# Patient Record
Sex: FEMALE | Race: WHITE | NOT HISPANIC OR LATINO | Employment: OTHER | URBAN - METROPOLITAN AREA
[De-identification: names, ages, dates, MRNs, and addresses within clinical notes are randomized per-mention and may not be internally consistent; named-entity substitution may affect disease eponyms.]

---

## 2017-03-07 ENCOUNTER — HOSPITAL ENCOUNTER (OUTPATIENT)
Dept: LAB | Facility: MEDICAL CENTER | Age: 37
End: 2017-03-07
Attending: FAMILY MEDICINE
Payer: COMMERCIAL

## 2017-03-07 LAB
ALBUMIN SERPL BCP-MCNC: 4.6 G/DL (ref 3.2–4.9)
ALBUMIN/GLOB SERPL: 1.6 G/DL
ALP SERPL-CCNC: 67 U/L (ref 30–99)
ALT SERPL-CCNC: 45 U/L (ref 2–50)
ANION GAP SERPL CALC-SCNC: 13 MMOL/L (ref 0–11.9)
AST SERPL-CCNC: 31 U/L (ref 12–45)
BASOPHILS # BLD AUTO: 0.04 K/UL (ref 0–0.12)
BASOPHILS NFR BLD AUTO: 0.7 % (ref 0–1.8)
BILIRUB SERPL-MCNC: 0.6 MG/DL (ref 0.1–1.5)
BUN SERPL-MCNC: 10 MG/DL (ref 8–22)
CALCIUM SERPL-MCNC: 9.7 MG/DL (ref 8.5–10.5)
CHLORIDE SERPL-SCNC: 105 MMOL/L (ref 96–112)
CHOLEST SERPL-MCNC: 135 MG/DL (ref 100–199)
CO2 SERPL-SCNC: 24 MMOL/L (ref 20–33)
CREAT SERPL-MCNC: 0.7 MG/DL (ref 0.5–1.4)
EOSINOPHIL # BLD: 0.03 K/UL (ref 0–0.51)
EOSINOPHIL NFR BLD AUTO: 0.5 % (ref 0–6.9)
ERYTHROCYTE [DISTWIDTH] IN BLOOD BY AUTOMATED COUNT: 45.6 FL (ref 35.9–50)
GLOBULIN SER CALC-MCNC: 2.9 G/DL (ref 1.9–3.5)
GLUCOSE SERPL-MCNC: 93 MG/DL (ref 65–99)
HCT VFR BLD AUTO: 47.3 % (ref 37–47)
HDLC SERPL-MCNC: 73 MG/DL
HGB BLD-MCNC: 15.3 G/DL (ref 12–16)
IMM GRANULOCYTES # BLD AUTO: 0.01 K/UL (ref 0–0.11)
IMM GRANULOCYTES NFR BLD AUTO: 0.2 % (ref 0–0.9)
LDLC SERPL CALC-MCNC: 48 MG/DL
LYMPHOCYTES # BLD: 1.39 K/UL (ref 1–4.8)
LYMPHOCYTES NFR BLD AUTO: 23.8 % (ref 22–41)
MCH RBC QN AUTO: 31.2 PG (ref 27–33)
MCHC RBC AUTO-ENTMCNC: 32.3 G/DL (ref 33.6–35)
MCV RBC AUTO: 96.5 FL (ref 81.4–97.8)
MONOCYTES # BLD: 0.3 K/UL (ref 0–0.85)
MONOCYTES NFR BLD AUTO: 5.1 % (ref 0–13.4)
NEUTROPHILS # BLD: 4.06 K/UL (ref 2–7.15)
NEUTROPHILS NFR BLD AUTO: 69.7 % (ref 44–72)
NRBC # BLD AUTO: 0.02 K/UL
NRBC BLD-RTO: 0.3 /100 WBC
PLATELET # BLD AUTO: 279 K/UL (ref 164–446)
PMV BLD AUTO: 10.1 FL (ref 9–12.9)
POTASSIUM SERPL-SCNC: 3.5 MMOL/L (ref 3.6–5.5)
PROT SERPL-MCNC: 7.5 G/DL (ref 6–8.2)
RBC # BLD AUTO: 4.9 M/UL (ref 4.2–5.4)
SODIUM SERPL-SCNC: 142 MMOL/L (ref 135–145)
TRIGL SERPL-MCNC: 68 MG/DL (ref 0–149)
TSH SERPL DL<=0.005 MIU/L-ACNC: 3.3 UIU/ML (ref 0.3–3.7)
WBC # BLD AUTO: 5.8 K/UL (ref 4.8–10.8)

## 2017-03-07 PROCEDURE — 85025 COMPLETE CBC W/AUTO DIFF WBC: CPT

## 2017-03-07 PROCEDURE — 84443 ASSAY THYROID STIM HORMONE: CPT

## 2017-03-07 PROCEDURE — 80061 LIPID PANEL: CPT

## 2017-03-07 PROCEDURE — 36415 COLL VENOUS BLD VENIPUNCTURE: CPT

## 2017-03-07 PROCEDURE — 80053 COMPREHEN METABOLIC PANEL: CPT

## 2017-04-17 DIAGNOSIS — Z79.899 HIGH RISK MEDICATION USE: ICD-10-CM

## 2017-04-17 DIAGNOSIS — Z01.818 PRE-OPERATIVE CLEARANCE: ICD-10-CM

## 2017-05-04 ENCOUNTER — HOSPITAL ENCOUNTER (OUTPATIENT)
Dept: RADIOLOGY | Facility: MEDICAL CENTER | Age: 37
End: 2017-05-04
Attending: FAMILY MEDICINE
Payer: COMMERCIAL

## 2017-05-04 VITALS
BODY MASS INDEX: 25.58 KG/M2 | OXYGEN SATURATION: 96 % | RESPIRATION RATE: 16 BRPM | HEART RATE: 65 BPM | DIASTOLIC BLOOD PRESSURE: 74 MMHG | SYSTOLIC BLOOD PRESSURE: 112 MMHG | HEIGHT: 67 IN | WEIGHT: 163 LBS

## 2017-05-04 DIAGNOSIS — R07.9 CHEST PAIN, UNSPECIFIED TYPE: ICD-10-CM

## 2017-05-04 PROCEDURE — 75574 CT ANGIO HRT W/3D IMAGE: CPT

## 2017-05-04 PROCEDURE — 700117 HCHG RX CONTRAST REV CODE 255: Performed by: FAMILY MEDICINE

## 2017-05-04 RX ADMIN — IOHEXOL 100 ML: 350 INJECTION, SOLUTION INTRAVENOUS at 16:22

## 2017-05-04 NOTE — FLOWSHEET NOTE
Patient tolerated CTA without incident. No meds given. Monitored with stable VS. Encouraged to drink fluids over next 24 hours. D/C to  in stable condition.

## 2017-07-07 ENCOUNTER — HOSPITAL ENCOUNTER (OUTPATIENT)
Dept: LAB | Facility: MEDICAL CENTER | Age: 37
End: 2017-07-07
Attending: OBSTETRICS & GYNECOLOGY
Payer: COMMERCIAL

## 2017-07-07 LAB
25(OH)D3 SERPL-MCNC: 30 NG/ML (ref 30–100)
ALBUMIN SERPL BCP-MCNC: 4 G/DL (ref 3.2–4.9)
ALBUMIN/GLOB SERPL: 1.4 G/DL
ALP SERPL-CCNC: 54 U/L (ref 30–99)
ALT SERPL-CCNC: 33 U/L (ref 2–50)
ANION GAP SERPL CALC-SCNC: 6 MMOL/L (ref 0–11.9)
AST SERPL-CCNC: 25 U/L (ref 12–45)
BILIRUB SERPL-MCNC: 0.5 MG/DL (ref 0.1–1.5)
BUN SERPL-MCNC: 14 MG/DL (ref 8–22)
CALCIUM SERPL-MCNC: 9.5 MG/DL (ref 8.5–10.5)
CHLORIDE SERPL-SCNC: 108 MMOL/L (ref 96–112)
CHOLEST SERPL-MCNC: 265 MG/DL (ref 100–199)
CO2 SERPL-SCNC: 26 MMOL/L (ref 20–33)
CREAT SERPL-MCNC: 0.75 MG/DL (ref 0.5–1.4)
CRP SERPL HS-MCNC: 1.3 MG/L (ref 0–7.5)
GFR SERPL CREATININE-BSD FRML MDRD: >60 ML/MIN/1.73 M 2
GLOBULIN SER CALC-MCNC: 2.9 G/DL (ref 1.9–3.5)
GLUCOSE SERPL-MCNC: 86 MG/DL (ref 65–99)
HDLC SERPL-MCNC: 57 MG/DL
LDLC SERPL CALC-MCNC: 187 MG/DL
POTASSIUM SERPL-SCNC: 4.5 MMOL/L (ref 3.6–5.5)
PROT SERPL-MCNC: 6.9 G/DL (ref 6–8.2)
SODIUM SERPL-SCNC: 140 MMOL/L (ref 135–145)
T3 SERPL-MCNC: 102.6 NG/DL (ref 60–181)
T3FREE SERPL-MCNC: 3.21 PG/ML (ref 2.4–4.2)
T4 FREE SERPL-MCNC: 0.79 NG/DL (ref 0.53–1.43)
TRIGL SERPL-MCNC: 104 MG/DL (ref 0–149)
TSH SERPL DL<=0.005 MIU/L-ACNC: 4.81 UIU/ML (ref 0.3–3.7)

## 2017-07-07 PROCEDURE — 36415 COLL VENOUS BLD VENIPUNCTURE: CPT

## 2017-07-07 PROCEDURE — 84443 ASSAY THYROID STIM HORMONE: CPT

## 2017-07-07 PROCEDURE — 80053 COMPREHEN METABOLIC PANEL: CPT

## 2017-07-07 PROCEDURE — 84481 FREE ASSAY (FT-3): CPT

## 2017-07-07 PROCEDURE — 84439 ASSAY OF FREE THYROXINE: CPT

## 2017-07-07 PROCEDURE — 86141 C-REACTIVE PROTEIN HS: CPT

## 2017-07-07 PROCEDURE — 80061 LIPID PANEL: CPT

## 2017-07-07 PROCEDURE — 82306 VITAMIN D 25 HYDROXY: CPT

## 2017-07-07 PROCEDURE — 84480 ASSAY TRIIODOTHYRONINE (T3): CPT

## 2017-07-13 ENCOUNTER — HOSPITAL ENCOUNTER (OUTPATIENT)
Dept: LAB | Facility: MEDICAL CENTER | Age: 37
End: 2017-07-13
Attending: OBSTETRICS & GYNECOLOGY
Payer: COMMERCIAL

## 2017-07-13 LAB — THYROPEROXIDASE AB SERPL-ACNC: <0.2 IU/ML (ref 0–9)

## 2017-07-13 PROCEDURE — 36415 COLL VENOUS BLD VENIPUNCTURE: CPT

## 2017-07-13 PROCEDURE — 86800 THYROGLOBULIN ANTIBODY: CPT

## 2017-07-13 PROCEDURE — 86376 MICROSOMAL ANTIBODY EACH: CPT

## 2017-07-15 LAB — THYROGLOB AB SERPL-ACNC: <0.9 IU/ML (ref 0–4)

## 2017-08-28 ENCOUNTER — HOSPITAL ENCOUNTER (OUTPATIENT)
Dept: LAB | Facility: MEDICAL CENTER | Age: 37
End: 2017-08-28
Attending: OBSTETRICS & GYNECOLOGY
Payer: COMMERCIAL

## 2017-08-28 LAB
T3FREE SERPL-MCNC: 3.22 PG/ML (ref 2.4–4.2)
T4 FREE SERPL-MCNC: 0.74 NG/DL (ref 0.53–1.43)
TSH SERPL DL<=0.005 MIU/L-ACNC: 1.32 UIU/ML (ref 0.3–3.7)

## 2017-08-28 PROCEDURE — 36415 COLL VENOUS BLD VENIPUNCTURE: CPT

## 2017-08-28 PROCEDURE — 84443 ASSAY THYROID STIM HORMONE: CPT

## 2017-08-28 PROCEDURE — 84481 FREE ASSAY (FT-3): CPT

## 2017-08-28 PROCEDURE — 84439 ASSAY OF FREE THYROXINE: CPT

## 2017-09-12 ENCOUNTER — OFFICE VISIT (OUTPATIENT)
Dept: URGENT CARE | Facility: PHYSICIAN GROUP | Age: 37
End: 2017-09-12
Payer: COMMERCIAL

## 2017-09-12 VITALS
BODY MASS INDEX: 24.8 KG/M2 | SYSTOLIC BLOOD PRESSURE: 110 MMHG | DIASTOLIC BLOOD PRESSURE: 78 MMHG | TEMPERATURE: 98.2 F | RESPIRATION RATE: 12 BRPM | WEIGHT: 158 LBS | OXYGEN SATURATION: 97 % | HEIGHT: 67 IN | HEART RATE: 76 BPM

## 2017-09-12 DIAGNOSIS — T21.23XA: ICD-10-CM

## 2017-09-12 PROCEDURE — 99203 OFFICE O/P NEW LOW 30 MIN: CPT | Performed by: NURSE PRACTITIONER

## 2017-09-12 ASSESSMENT — ENCOUNTER SYMPTOMS
MYALGIAS: 0
CHILLS: 0
BURN: 1
VOMITING: 0
NAUSEA: 0
FEVER: 0

## 2017-09-12 NOTE — PROGRESS NOTES
"Subjective:      Candy Dey is a 37 y.o. female who presents with Burn (Burn on left upper back from cupping procedure x 24hrs)            Burn   This is a new problem. The current episode started yesterday. The problem occurs constantly. The problem has been unchanged. Pertinent negatives include no chills, fever, myalgias, nausea or vomiting. Nothing aggravates the symptoms. She has tried nothing for the symptoms.   burn to posterior left shoulder/upper back since yesterday. Burned during \"cupping\" session.     Review of Systems   Constitutional: Negative for chills and fever.   Gastrointestinal: Negative for nausea and vomiting.   Musculoskeletal: Negative for myalgias.          Objective:     /78   Pulse 76   Temp 36.8 °C (98.2 °F)   Resp 12   Ht 1.702 m (5' 7\")   Wt 71.7 kg (158 lb)   SpO2 97%   Breastfeeding? No   BMI 24.75 kg/m²      Physical Exam   Constitutional: She is oriented to person, place, and time. She appears well-developed and well-nourished. No distress.   Cardiovascular: Normal rate, regular rhythm and normal heart sounds.    No murmur heard.  Pulmonary/Chest: Effort normal and breath sounds normal. No respiratory distress.   Musculoskeletal: Normal range of motion.   Moves all 4 extremities normally   Neurological: She is alert and oriented to person, place, and time.   Skin: Skin is warm and dry. Burn noted.        Second degree burn with broken blister noted. Area dressed with silvadene and non adherent dressing.   Psychiatric: She has a normal mood and affect. Her behavior is normal. Thought content normal.   Nursing note and vitals reviewed.              Assessment/Plan:     1. Second degree burn of upper back, initial encounter  silver sulfADIAZINE (SILVADENE) 1 % Cream     Burn care reviewed with patient.   Differential diagnosis, natural history, supportive care, and indications for immediate follow-up discussed at length.     "

## 2017-10-11 ENCOUNTER — HOSPITAL ENCOUNTER (OUTPATIENT)
Dept: LAB | Facility: MEDICAL CENTER | Age: 37
End: 2017-10-11
Attending: FAMILY MEDICINE
Payer: COMMERCIAL

## 2017-10-11 LAB
ALBUMIN SERPL BCP-MCNC: 4.4 G/DL (ref 3.2–4.9)
ALBUMIN/GLOB SERPL: 1.7 G/DL
ALP SERPL-CCNC: 66 U/L (ref 30–99)
ALT SERPL-CCNC: 29 U/L (ref 2–50)
ANION GAP SERPL CALC-SCNC: 9 MMOL/L (ref 0–11.9)
AST SERPL-CCNC: 21 U/L (ref 12–45)
BILIRUB SERPL-MCNC: 0.5 MG/DL (ref 0.1–1.5)
BUN SERPL-MCNC: 14 MG/DL (ref 8–22)
CALCIUM SERPL-MCNC: 9.7 MG/DL (ref 8.5–10.5)
CHLORIDE SERPL-SCNC: 106 MMOL/L (ref 96–112)
CHOLEST SERPL-MCNC: 158 MG/DL (ref 100–199)
CO2 SERPL-SCNC: 26 MMOL/L (ref 20–33)
CREAT SERPL-MCNC: 0.72 MG/DL (ref 0.5–1.4)
GFR SERPL CREATININE-BSD FRML MDRD: >60 ML/MIN/1.73 M 2
GLOBULIN SER CALC-MCNC: 2.6 G/DL (ref 1.9–3.5)
GLUCOSE SERPL-MCNC: 100 MG/DL (ref 65–99)
HDLC SERPL-MCNC: 61 MG/DL
LDLC SERPL CALC-MCNC: 74 MG/DL
POTASSIUM SERPL-SCNC: 4.5 MMOL/L (ref 3.6–5.5)
PROT SERPL-MCNC: 7 G/DL (ref 6–8.2)
SODIUM SERPL-SCNC: 141 MMOL/L (ref 135–145)
TRIGL SERPL-MCNC: 113 MG/DL (ref 0–149)

## 2017-10-11 PROCEDURE — 80053 COMPREHEN METABOLIC PANEL: CPT

## 2017-10-11 PROCEDURE — 80061 LIPID PANEL: CPT

## 2017-10-11 PROCEDURE — 36415 COLL VENOUS BLD VENIPUNCTURE: CPT

## 2018-07-05 ENCOUNTER — APPOINTMENT (OUTPATIENT)
Dept: RADIOLOGY | Facility: MEDICAL CENTER | Age: 38
End: 2018-07-05
Attending: ORTHOPAEDIC SURGERY
Payer: COMMERCIAL

## 2018-07-05 DIAGNOSIS — M54.12 BRACHIAL NEURITIS: ICD-10-CM

## 2018-07-05 PROCEDURE — 72141 MRI NECK SPINE W/O DYE: CPT

## 2018-09-20 ENCOUNTER — HOSPITAL ENCOUNTER (OUTPATIENT)
Dept: LAB | Facility: MEDICAL CENTER | Age: 38
End: 2018-09-20
Attending: FAMILY MEDICINE
Payer: COMMERCIAL

## 2018-09-20 LAB
ALBUMIN SERPL BCP-MCNC: 4.7 G/DL (ref 3.2–4.9)
ALBUMIN/GLOB SERPL: 1.8 G/DL
ALP SERPL-CCNC: 67 U/L (ref 30–99)
ALT SERPL-CCNC: 44 U/L (ref 2–50)
ANION GAP SERPL CALC-SCNC: 11 MMOL/L (ref 0–11.9)
APPEARANCE UR: CLEAR
AST SERPL-CCNC: 28 U/L (ref 12–45)
BASOPHILS # BLD AUTO: 0.7 % (ref 0–1.8)
BASOPHILS # BLD: 0.04 K/UL (ref 0–0.12)
BILIRUB SERPL-MCNC: 0.7 MG/DL (ref 0.1–1.5)
BILIRUB UR QL STRIP.AUTO: NEGATIVE
BUN SERPL-MCNC: 14 MG/DL (ref 8–22)
CALCIUM SERPL-MCNC: 9.9 MG/DL (ref 8.5–10.5)
CHLORIDE SERPL-SCNC: 101 MMOL/L (ref 96–112)
CHOLEST SERPL-MCNC: 151 MG/DL (ref 100–199)
CO2 SERPL-SCNC: 27 MMOL/L (ref 20–33)
COLOR UR: YELLOW
CREAT SERPL-MCNC: 0.79 MG/DL (ref 0.5–1.4)
EOSINOPHIL # BLD AUTO: 0.16 K/UL (ref 0–0.51)
EOSINOPHIL NFR BLD: 3 % (ref 0–6.9)
ERYTHROCYTE [DISTWIDTH] IN BLOOD BY AUTOMATED COUNT: 44.7 FL (ref 35.9–50)
ESTRADIOL SERPL-MCNC: <20 PG/ML
FASTING STATUS PATIENT QL REPORTED: NORMAL
FSH SERPL-ACNC: 146.8 MIU/ML
GLOBULIN SER CALC-MCNC: 2.6 G/DL (ref 1.9–3.5)
GLUCOSE SERPL-MCNC: 91 MG/DL (ref 65–99)
GLUCOSE UR STRIP.AUTO-MCNC: NEGATIVE MG/DL
HCT VFR BLD AUTO: 47.9 % (ref 37–47)
HDLC SERPL-MCNC: 62 MG/DL
HGB BLD-MCNC: 16 G/DL (ref 12–16)
IMM GRANULOCYTES # BLD AUTO: 0.01 K/UL (ref 0–0.11)
IMM GRANULOCYTES NFR BLD AUTO: 0.2 % (ref 0–0.9)
KETONES UR STRIP.AUTO-MCNC: NEGATIVE MG/DL
LDLC SERPL CALC-MCNC: 67 MG/DL
LEUKOCYTE ESTERASE UR QL STRIP.AUTO: NEGATIVE
LH SERPL-ACNC: 58 IU/L
LYMPHOCYTES # BLD AUTO: 2.2 K/UL (ref 1–4.8)
LYMPHOCYTES NFR BLD: 40.7 % (ref 22–41)
MCH RBC QN AUTO: 32.5 PG (ref 27–33)
MCHC RBC AUTO-ENTMCNC: 33.4 G/DL (ref 33.6–35)
MCV RBC AUTO: 97.2 FL (ref 81.4–97.8)
MICRO URNS: NORMAL
MONOCYTES # BLD AUTO: 0.37 K/UL (ref 0–0.85)
MONOCYTES NFR BLD AUTO: 6.9 % (ref 0–13.4)
NEUTROPHILS # BLD AUTO: 2.62 K/UL (ref 2–7.15)
NEUTROPHILS NFR BLD: 48.5 % (ref 44–72)
NITRITE UR QL STRIP.AUTO: NEGATIVE
NRBC # BLD AUTO: 0 K/UL
NRBC BLD-RTO: 0 /100 WBC
PH UR STRIP.AUTO: 6 [PH]
PLATELET # BLD AUTO: 289 K/UL (ref 164–446)
PMV BLD AUTO: 10.4 FL (ref 9–12.9)
POTASSIUM SERPL-SCNC: 4.6 MMOL/L (ref 3.6–5.5)
PROT SERPL-MCNC: 7.3 G/DL (ref 6–8.2)
PROT UR QL STRIP: NEGATIVE MG/DL
RBC # BLD AUTO: 4.93 M/UL (ref 4.2–5.4)
RBC UR QL AUTO: NEGATIVE
SODIUM SERPL-SCNC: 139 MMOL/L (ref 135–145)
SP GR UR STRIP.AUTO: 1.02
T4 SERPL-MCNC: 8 UG/DL (ref 4–12)
TRIGL SERPL-MCNC: 108 MG/DL (ref 0–149)
TSH SERPL DL<=0.005 MIU/L-ACNC: 3.49 UIU/ML (ref 0.38–5.33)
UROBILINOGEN UR STRIP.AUTO-MCNC: 0.2 MG/DL
WBC # BLD AUTO: 5.4 K/UL (ref 4.8–10.8)

## 2018-09-20 PROCEDURE — 36415 COLL VENOUS BLD VENIPUNCTURE: CPT

## 2018-09-20 PROCEDURE — 84443 ASSAY THYROID STIM HORMONE: CPT

## 2018-09-20 PROCEDURE — 83002 ASSAY OF GONADOTROPIN (LH): CPT

## 2018-09-20 PROCEDURE — 83001 ASSAY OF GONADOTROPIN (FSH): CPT

## 2018-09-20 PROCEDURE — 84436 ASSAY OF TOTAL THYROXINE: CPT

## 2018-09-20 PROCEDURE — 85652 RBC SED RATE AUTOMATED: CPT

## 2018-09-20 PROCEDURE — 80053 COMPREHEN METABOLIC PANEL: CPT

## 2018-09-20 PROCEDURE — 85025 COMPLETE CBC W/AUTO DIFF WBC: CPT

## 2018-09-20 PROCEDURE — 81003 URINALYSIS AUTO W/O SCOPE: CPT

## 2018-09-20 PROCEDURE — 80061 LIPID PANEL: CPT

## 2018-09-20 PROCEDURE — 82670 ASSAY OF TOTAL ESTRADIOL: CPT

## 2018-09-21 LAB — ERYTHROCYTE [SEDIMENTATION RATE] IN BLOOD BY WESTERGREN METHOD: 5 MM/HOUR (ref 0–20)

## 2019-03-21 ENCOUNTER — HOSPITAL ENCOUNTER (OUTPATIENT)
Dept: RADIOLOGY | Facility: MEDICAL CENTER | Age: 39
End: 2019-03-21
Attending: PHYSICIAN ASSISTANT
Payer: COMMERCIAL

## 2019-03-21 ENCOUNTER — OFFICE VISIT (OUTPATIENT)
Dept: URGENT CARE | Facility: PHYSICIAN GROUP | Age: 39
End: 2019-03-21
Payer: COMMERCIAL

## 2019-03-21 VITALS
SYSTOLIC BLOOD PRESSURE: 110 MMHG | OXYGEN SATURATION: 95 % | TEMPERATURE: 98.5 F | RESPIRATION RATE: 16 BRPM | BODY MASS INDEX: 24.64 KG/M2 | WEIGHT: 157 LBS | HEART RATE: 84 BPM | DIASTOLIC BLOOD PRESSURE: 66 MMHG | HEIGHT: 67 IN

## 2019-03-21 DIAGNOSIS — H66.001 ACUTE SUPPURATIVE OTITIS MEDIA OF RIGHT EAR WITHOUT SPONTANEOUS RUPTURE OF TYMPANIC MEMBRANE, RECURRENCE NOT SPECIFIED: Primary | ICD-10-CM

## 2019-03-21 DIAGNOSIS — R05.9 COUGH: ICD-10-CM

## 2019-03-21 DIAGNOSIS — R09.82 POST-NASAL DRIP: ICD-10-CM

## 2019-03-21 PROCEDURE — 71046 X-RAY EXAM CHEST 2 VIEWS: CPT

## 2019-03-21 PROCEDURE — 99214 OFFICE O/P EST MOD 30 MIN: CPT | Performed by: PHYSICIAN ASSISTANT

## 2019-03-21 RX ORDER — BENZONATATE 200 MG/1
200 CAPSULE ORAL 3 TIMES DAILY PRN
Qty: 60 CAP | Refills: 0 | Status: SHIPPED | OUTPATIENT
Start: 2019-03-21 | End: 2020-05-29

## 2019-03-21 RX ORDER — CODEINE PHOSPHATE/GUAIFENESIN 10-100MG/5
10 LIQUID (ML) ORAL 4 TIMES DAILY PRN
Qty: 200 ML | Refills: 0 | Status: SHIPPED | OUTPATIENT
Start: 2019-03-21 | End: 2019-03-26

## 2019-03-21 RX ORDER — AMOXICILLIN AND CLAVULANATE POTASSIUM 875; 125 MG/1; MG/1
1 TABLET, FILM COATED ORAL 2 TIMES DAILY
Qty: 20 TAB | Refills: 0 | Status: SHIPPED | OUTPATIENT
Start: 2019-03-21 | End: 2019-03-25

## 2019-03-21 RX ORDER — FLUCONAZOLE 150 MG/1
150 TABLET ORAL DAILY
Qty: 1 TAB | Refills: 0 | Status: SHIPPED | OUTPATIENT
Start: 2019-03-21 | End: 2020-05-29

## 2019-03-21 ASSESSMENT — ENCOUNTER SYMPTOMS: COUGH: 1

## 2019-03-21 NOTE — PROGRESS NOTES
"Subjective:      Candy Dey is a 39 y.o. female who presents with Cough (cough, congestion, fatigue, x 2.5 weeks is on z-pack x 3 days)    PMH: Reviewed with patient/family member/EPIC.    MEDS:   Current Outpatient Prescriptions:   •  Evolocumab (REPATHA SC), Inject  as instructed., Disp: , Rfl:   •  alprazolam (XANAX) 0.5 MG Tab, Take 0.5-1 mg by mouth every evening., Disp: , Rfl:   •  amphetamine-dextroamphetamine (ADDERALL) 20 MG Tab, Take 20 mg by mouth 2 times a day. Indications: takes as needed (normally takes one \"regularly\"), Disp: , Rfl:   •  atorvastatin (LIPITOR) 20 MG Tab, Take 80 mg by mouth every evening., Disp: , Rfl:   •  estradiol (ESTRACE) 1 MG Tab, Take 1 mg by mouth 2 Times a Day. Indications: 1/2 in am and full tablet at night., Disp: , Rfl:   •  silver sulfADIAZINE (SILVADENE) 1 % Cream, Apply 1 g to affected area(s) every day. (Patient not taking: Reported on 3/21/2019), Disp: 60 g, Rfl: 0  •  aspirin (ASA) 325 MG Tab, Take 1 Tab by mouth every day., Disp: 100 Tab, Rfl: 0  ALLERGIES: No Known Allergies  SURGHX: No past surgical history on file.  SOCHX:  reports that she has been smoking Cigarettes.  She has never used smokeless tobacco. She reports that she drinks alcohol. She reports that she uses drugs, including Oral.  FH: Reviewed with patient, not pertinent to this visit.           Patient presents with:  Cough: cough, congestion, fatigue, x 2.5 weeks is on z-pack x 3 days          Cough   This is a new problem. The current episode started 1 to 4 weeks ago. The problem has been gradually worsening. The problem occurs every few minutes. The cough is productive of sputum. Associated symptoms include ear pain, headaches, nasal congestion, postnasal drip and rhinorrhea. Pertinent negatives include no fever, shortness of breath or wheezing. The symptoms are aggravated by lying down and cold air. She has tried body position changes and OTC cough suppressant (zpack) for the symptoms. " "The treatment provided no relief. Her past medical history is significant for bronchitis.       Review of Systems   Constitutional: Negative for fever.   HENT: Positive for congestion, ear pain, postnasal drip, rhinorrhea and sinus pain.    Respiratory: Positive for cough and sputum production. Negative for shortness of breath and wheezing.    Neurological: Positive for headaches.   All other systems reviewed and are negative.         Objective:     /66 (BP Location: Left arm, Patient Position: Sitting, BP Cuff Size: Adult)   Pulse 84   Temp 36.9 °C (98.5 °F) (Temporal)   Resp 16   Ht 1.702 m (5' 7\")   Wt 71.2 kg (157 lb)   SpO2 95%   BMI 24.59 kg/m²      Physical Exam   Constitutional: She is oriented to person, place, and time. She appears well-developed and well-nourished. No distress.   HENT:   Head: Normocephalic and atraumatic.   Right Ear: Tympanic membrane is injected, erythematous and bulging. A middle ear effusion is present.   Left Ear: Tympanic membrane normal.   Nose: Mucosal edema and rhinorrhea present.   Mouth/Throat: Uvula is midline, oropharynx is clear and moist and mucous membranes are normal.   Eyes: Pupils are equal, round, and reactive to light. Conjunctivae and EOM are normal.   Neck: Normal range of motion. Neck supple.   Cardiovascular: Normal rate, regular rhythm and normal heart sounds.    Pulmonary/Chest: Effort normal. No respiratory distress.   Abdominal: Soft.   Musculoskeletal: Normal range of motion.   Neurological: She is alert and oriented to person, place, and time. Gait normal.   Skin: Skin is warm and dry. Capillary refill takes less than 2 seconds.   Psychiatric: She has a normal mood and affect.   Nursing note and vitals reviewed.            Xray images viewed and interpreted by me, confirmed by radiology:      CHEST   FINDINGS:  Cardiomediastinal contour is within normal limits.  No focal pulmonary consolidation.  No pleural fluid collection or " pneumothorax.  Mild degenerative change of thoracic spine.   Impression       No acute cardiopulmonary disease.   Reading Provider Reading Date   Steven Stoddard M.D. Mar 21, 2019   Signing Provider Signing Date Signing Time   Steven Stoddard M.D. Mar 21, 2019  1:45 PM          Assessment/Plan:      1. Acute suppurative otitis media of right ear without spontaneous rupture of tympanic membrane, recurrence not specified  fluconazole (DIFLUCAN) 150 MG tablet    guaifenesin-codeine (TUSSI-ORGANIDIN NR) 100-10 MG/5ML syrup    benzonatate (TESSALON) 200 MG capsule        2. Cough  DX-CHEST-2 VIEWS    fluconazole (DIFLUCAN) 150 MG tablet    guaifenesin-codeine (TUSSI-ORGANIDIN NR) 100-10 MG/5ML syrup    benzonatate (TESSALON) 200 MG capsule        3. Post-nasal drip  fluconazole (DIFLUCAN) 150 MG tablet    guaifenesin-codeine (TUSSI-ORGANIDIN NR) 100-10 MG/5ML syrup    benzonatate (TESSALON) 200 MG capsule          Pt to begin augmentin today.     PT instructed not to drive or operate heavy machinery or drink alcohol while taking this medication because it contains either a narcotic or benzodiazepines which causes drowsiness. PT verbalized understanding of these instructions.     [facility] Aware web site evaluation: I have obtained and reviewed patient utilization report from [facility] pharmacy database prior to writing prescription for controlled substance.  No history of abuse.

## 2019-03-25 ENCOUNTER — OFFICE VISIT (OUTPATIENT)
Dept: URGENT CARE | Facility: PHYSICIAN GROUP | Age: 39
End: 2019-03-25
Payer: COMMERCIAL

## 2019-03-25 VITALS
TEMPERATURE: 96.7 F | SYSTOLIC BLOOD PRESSURE: 118 MMHG | DIASTOLIC BLOOD PRESSURE: 80 MMHG | RESPIRATION RATE: 18 BRPM | OXYGEN SATURATION: 94 % | HEIGHT: 67 IN | BODY MASS INDEX: 24.48 KG/M2 | HEART RATE: 91 BPM | WEIGHT: 156 LBS

## 2019-03-25 DIAGNOSIS — H66.001 ACUTE SUPPURATIVE OTITIS MEDIA OF RIGHT EAR WITHOUT SPONTANEOUS RUPTURE OF TYMPANIC MEMBRANE, RECURRENCE NOT SPECIFIED: ICD-10-CM

## 2019-03-25 PROCEDURE — 99213 OFFICE O/P EST LOW 20 MIN: CPT | Performed by: FAMILY MEDICINE

## 2019-03-25 RX ORDER — CEFDINIR 300 MG/1
300 CAPSULE ORAL 2 TIMES DAILY
Qty: 20 CAP | Refills: 0 | Status: SHIPPED | OUTPATIENT
Start: 2019-03-25 | End: 2020-05-29

## 2019-03-25 ASSESSMENT — ENCOUNTER SYMPTOMS
DIARRHEA: 0
WHEEZING: 0
RHINORRHEA: 1
SORE THROAT: 0
NAUSEA: 0
COUGH: 1
SHORTNESS OF BREATH: 0
SHORTNESS OF BREATH: 0
SPUTUM PRODUCTION: 1
FEVER: 0
ABDOMINAL PAIN: 0
HEADACHES: 0
HEADACHES: 1
FEVER: 0
SINUS PAIN: 1
VOMITING: 0

## 2019-03-25 NOTE — PROGRESS NOTES
Subjective:     Candy Dey is a 39 y.o. female who presents for Cough (Has not improved since last visit, R ear fullness and pain, fever)    HPI  Pt presents for evaluation of worsening problem   Pt with cough for the past 3 weeks   Also having nasal congestion and right ear pain   Cough is dry and non-productive   Cough is constant and nothing makes it better   Had a chest x-ray 4 days ago which was normal  Was initially treated with 3 days of azithromycin and then switched to Augmentin  Has been on Augmentin for the past 4 days     Review of Systems   Constitutional: Negative for fever.   HENT: Positive for congestion and ear pain. Negative for sore throat.    Respiratory: Positive for cough. Negative for shortness of breath.    Cardiovascular: Negative for chest pain.   Gastrointestinal: Negative for abdominal pain, diarrhea, nausea and vomiting.   Skin: Negative for rash.   Neurological: Negative for headaches.     PMH: No chronic medical problems   MEDS:   Current Outpatient Prescriptions:   •  Evolocumab (REPATHA SC), Inject  as instructed., Disp: , Rfl:   •  amoxicillin-clavulanate (AUGMENTIN) 875-125 MG Tab, Take 1 Tab by mouth 2 times a day., Disp: 20 Tab, Rfl: 0  •  fluconazole (DIFLUCAN) 150 MG tablet, Take 1 Tab by mouth every day., Disp: 1 Tab, Rfl: 0  •  guaifenesin-codeine (TUSSI-ORGANIDIN NR) 100-10 MG/5ML syrup, Take 10 mL by mouth 4 times a day as needed for Cough for up to 5 days., Disp: 200 mL, Rfl: 0  •  benzonatate (TESSALON) 200 MG capsule, Take 1 Cap by mouth 3 times a day as needed for Cough., Disp: 60 Cap, Rfl: 0  •  silver sulfADIAZINE (SILVADENE) 1 % Cream, Apply 1 g to affected area(s) every day. (Patient not taking: Reported on 3/21/2019), Disp: 60 g, Rfl: 0  •  aspirin (ASA) 325 MG Tab, Take 1 Tab by mouth every day., Disp: 100 Tab, Rfl: 0  •  alprazolam (XANAX) 0.5 MG Tab, Take 0.5-1 mg by mouth every evening., Disp: , Rfl:   •  amphetamine-dextroamphetamine (ADDERALL) 20 MG  "Tab, Take 20 mg by mouth 2 times a day. Indications: takes as needed (normally takes one \"regularly\"), Disp: , Rfl:   •  atorvastatin (LIPITOR) 20 MG Tab, Take 80 mg by mouth every evening., Disp: , Rfl:   •  estradiol (ESTRACE) 1 MG Tab, Take 1 mg by mouth 2 Times a Day. Indications: 1/2 in am and full tablet at night., Disp: , Rfl:   ALLERGIES: No Known Allergies  SURGHX: No past surgical history on file.  SOCHX:  reports that she has been smoking Cigarettes.  She has never used smokeless tobacco. She reports that she drinks alcohol. She reports that she uses drugs, including Oral.  FH: Family history was reviewed, not contributing to acute illness     Objective:   /80 (BP Location: Left arm, Patient Position: Sitting, BP Cuff Size: Adult)   Pulse 91   Temp 35.9 °C (96.7 °F) (Temporal)   Resp 18   Ht 1.702 m (5' 7\")   Wt 70.8 kg (156 lb)   SpO2 94%   BMI 24.43 kg/m²     Physical Exam   Constitutional: She appears well-developed and well-nourished. No distress.   HENT:   Head: Normocephalic and atraumatic.   Right Ear: External ear normal.   Left Ear: External ear normal.   Nose: Mucosal edema and rhinorrhea present.   Mouth/Throat: Uvula is midline, oropharynx is clear and moist and mucous membranes are normal.   Right tympanic membrane with no erythema, moderate purulent effusion present, no perforation appreciated   Eyes: Conjunctivae and EOM are normal. Right eye exhibits no discharge. Left eye exhibits no discharge. No scleral icterus.   Neck: Normal range of motion. No tracheal deviation present.   Cardiovascular: Normal rate and regular rhythm.    Pulmonary/Chest: Effort normal and breath sounds normal. No respiratory distress. She has no wheezes. She has no rales.   Neurological: She is alert. Coordination normal.   Skin: Skin is warm and dry. No rash noted. She is not diaphoretic.   Psychiatric: She has a normal mood and affect. Her behavior is normal. Judgment and thought content normal. "     Assessment/Plan:   Assessment    1. Acute suppurative otitis media of right ear without spontaneous rupture of tympanic membrane, recurrence not specified  Patient is a 39-year-old female with right-sided ear infection which is still present after 4 days of Augmentin.  Advised that patient should still continue Augmentin for a full course as it is too early to tell if the antibiotic has failed.  If still having pain at the end of Augmentin, patient will start Omnicef.  If still not clearing on Omnicef, recommend ENT referral.  - cefdinir (OMNICEF) 300 MG Cap; Take 1 Cap by mouth 2 times a day.  Dispense: 20 Cap; Refill: 0

## 2019-12-04 ENCOUNTER — HOSPITAL ENCOUNTER (OUTPATIENT)
Dept: LAB | Facility: MEDICAL CENTER | Age: 39
End: 2019-12-04
Attending: FAMILY MEDICINE
Payer: COMMERCIAL

## 2019-12-04 LAB
ALBUMIN SERPL BCP-MCNC: 4.6 G/DL (ref 3.2–4.9)
ALBUMIN/GLOB SERPL: 1.8 G/DL
ALP SERPL-CCNC: 55 U/L (ref 30–99)
ALT SERPL-CCNC: 39 U/L (ref 2–50)
ANION GAP SERPL CALC-SCNC: 10 MMOL/L (ref 0–11.9)
APPEARANCE UR: ABNORMAL
AST SERPL-CCNC: 25 U/L (ref 12–45)
BACTERIA #/AREA URNS HPF: ABNORMAL /HPF
BASOPHILS # BLD AUTO: 0.5 % (ref 0–1.8)
BASOPHILS # BLD: 0.03 K/UL (ref 0–0.12)
BILIRUB SERPL-MCNC: 0.6 MG/DL (ref 0.1–1.5)
BILIRUB UR QL STRIP.AUTO: NEGATIVE
BUN SERPL-MCNC: 16 MG/DL (ref 8–22)
CALCIUM SERPL-MCNC: 10 MG/DL (ref 8.5–10.5)
CAOX CRY #/AREA URNS HPF: ABNORMAL /HPF
CHLORIDE SERPL-SCNC: 106 MMOL/L (ref 96–112)
CHOLEST SERPL-MCNC: 113 MG/DL (ref 100–199)
CO2 SERPL-SCNC: 25 MMOL/L (ref 20–33)
COLOR UR: YELLOW
CREAT SERPL-MCNC: 0.76 MG/DL (ref 0.5–1.4)
EOSINOPHIL # BLD AUTO: 0.08 K/UL (ref 0–0.51)
EOSINOPHIL NFR BLD: 1.2 % (ref 0–6.9)
EPI CELLS #/AREA URNS HPF: ABNORMAL /HPF
ERYTHROCYTE [DISTWIDTH] IN BLOOD BY AUTOMATED COUNT: 45 FL (ref 35.9–50)
FASTING STATUS PATIENT QL REPORTED: NORMAL
GLOBULIN SER CALC-MCNC: 2.6 G/DL (ref 1.9–3.5)
GLUCOSE SERPL-MCNC: 95 MG/DL (ref 65–99)
GLUCOSE UR STRIP.AUTO-MCNC: NEGATIVE MG/DL
HCT VFR BLD AUTO: 46.4 % (ref 37–47)
HDLC SERPL-MCNC: 61 MG/DL
HGB BLD-MCNC: 15.2 G/DL (ref 12–16)
HYALINE CASTS #/AREA URNS LPF: ABNORMAL /LPF
IMM GRANULOCYTES # BLD AUTO: 0.03 K/UL (ref 0–0.11)
IMM GRANULOCYTES NFR BLD AUTO: 0.5 % (ref 0–0.9)
KETONES UR STRIP.AUTO-MCNC: ABNORMAL MG/DL
LDLC SERPL CALC-MCNC: 34 MG/DL
LEUKOCYTE ESTERASE UR QL STRIP.AUTO: ABNORMAL
LYMPHOCYTES # BLD AUTO: 1.83 K/UL (ref 1–4.8)
LYMPHOCYTES NFR BLD: 27.6 % (ref 22–41)
MCH RBC QN AUTO: 32.3 PG (ref 27–33)
MCHC RBC AUTO-ENTMCNC: 32.8 G/DL (ref 33.6–35)
MCV RBC AUTO: 98.7 FL (ref 81.4–97.8)
MICRO URNS: ABNORMAL
MONOCYTES # BLD AUTO: 0.49 K/UL (ref 0–0.85)
MONOCYTES NFR BLD AUTO: 7.4 % (ref 0–13.4)
NEUTROPHILS # BLD AUTO: 4.17 K/UL (ref 2–7.15)
NEUTROPHILS NFR BLD: 62.8 % (ref 44–72)
NITRITE UR QL STRIP.AUTO: NEGATIVE
NRBC # BLD AUTO: 0 K/UL
NRBC BLD-RTO: 0 /100 WBC
PH UR STRIP.AUTO: 5.5 [PH] (ref 5–8)
PLATELET # BLD AUTO: 279 K/UL (ref 164–446)
PMV BLD AUTO: 10.5 FL (ref 9–12.9)
POTASSIUM SERPL-SCNC: 4 MMOL/L (ref 3.6–5.5)
PROT SERPL-MCNC: 7.2 G/DL (ref 6–8.2)
PROT UR QL STRIP: NEGATIVE MG/DL
RBC # BLD AUTO: 4.7 M/UL (ref 4.2–5.4)
RBC # URNS HPF: ABNORMAL /HPF
RBC UR QL AUTO: NEGATIVE
SODIUM SERPL-SCNC: 141 MMOL/L (ref 135–145)
SP GR UR STRIP.AUTO: 1.03
T4 SERPL-MCNC: 7.3 UG/DL (ref 4–12)
TRIGL SERPL-MCNC: 92 MG/DL (ref 0–149)
TSH SERPL DL<=0.005 MIU/L-ACNC: 0.79 UIU/ML (ref 0.38–5.33)
UROBILINOGEN UR STRIP.AUTO-MCNC: 0.2 MG/DL
WBC # BLD AUTO: 6.6 K/UL (ref 4.8–10.8)
WBC #/AREA URNS HPF: ABNORMAL /HPF

## 2019-12-04 PROCEDURE — 85025 COMPLETE CBC W/AUTO DIFF WBC: CPT

## 2019-12-04 PROCEDURE — 81001 URINALYSIS AUTO W/SCOPE: CPT

## 2019-12-04 PROCEDURE — 80061 LIPID PANEL: CPT

## 2019-12-04 PROCEDURE — 36415 COLL VENOUS BLD VENIPUNCTURE: CPT

## 2019-12-04 PROCEDURE — 80053 COMPREHEN METABOLIC PANEL: CPT

## 2019-12-04 PROCEDURE — 84443 ASSAY THYROID STIM HORMONE: CPT

## 2019-12-04 PROCEDURE — 84436 ASSAY OF TOTAL THYROXINE: CPT

## 2020-01-06 ENCOUNTER — HOSPITAL ENCOUNTER (OUTPATIENT)
Dept: RADIOLOGY | Facility: MEDICAL CENTER | Age: 40
End: 2020-01-06
Attending: OBSTETRICS & GYNECOLOGY
Payer: COMMERCIAL

## 2020-01-06 DIAGNOSIS — Z12.31 VISIT FOR SCREENING MAMMOGRAM: ICD-10-CM

## 2020-01-06 PROCEDURE — 77067 SCR MAMMO BI INCL CAD: CPT

## 2020-05-09 ENCOUNTER — HOSPITAL ENCOUNTER (OUTPATIENT)
Dept: RADIOLOGY | Facility: MEDICAL CENTER | Age: 40
End: 2020-05-09
Attending: NURSE PRACTITIONER
Payer: COMMERCIAL

## 2020-05-09 ENCOUNTER — OFFICE VISIT (OUTPATIENT)
Dept: URGENT CARE | Facility: PHYSICIAN GROUP | Age: 40
End: 2020-05-09
Payer: COMMERCIAL

## 2020-05-09 VITALS
OXYGEN SATURATION: 97 % | TEMPERATURE: 98.6 F | RESPIRATION RATE: 16 BRPM | DIASTOLIC BLOOD PRESSURE: 74 MMHG | SYSTOLIC BLOOD PRESSURE: 116 MMHG | HEART RATE: 87 BPM

## 2020-05-09 DIAGNOSIS — S69.92XA WRIST INJURY, LEFT, INITIAL ENCOUNTER: ICD-10-CM

## 2020-05-09 DIAGNOSIS — S52.592A OTHER CLOSED FRACTURE OF DISTAL END OF LEFT RADIUS, INITIAL ENCOUNTER: ICD-10-CM

## 2020-05-09 DIAGNOSIS — S82.832A OTHER CLOSED FRACTURE OF DISTAL END OF LEFT FIBULA, INITIAL ENCOUNTER: ICD-10-CM

## 2020-05-09 DIAGNOSIS — S99.912A ANKLE INJURY, LEFT, INITIAL ENCOUNTER: ICD-10-CM
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
## 2020-05-09 PROCEDURE — 73110 X-RAY EXAM OF WRIST: CPT | Mod: LT

## 2020-05-09 PROCEDURE — 99214 OFFICE O/P EST MOD 30 MIN: CPT | Performed by: NURSE PRACTITIONER

## 2020-05-09 PROCEDURE — 73610 X-RAY EXAM OF ANKLE: CPT | Mod: LT

## 2020-05-09 RX ORDER — KETOROLAC TROMETHAMINE 30 MG/ML
60 INJECTION, SOLUTION INTRAMUSCULAR; INTRAVENOUS ONCE
Status: COMPLETED | OUTPATIENT
Start: 2020-05-09 | End: 2020-05-09

## 2020-05-09 RX ORDER — HYDROCODONE BITARTRATE AND ACETAMINOPHEN 7.5; 325 MG/1; MG/1
1 TABLET ORAL EVERY 6 HOURS PRN
Qty: 20 TAB | Refills: 0 | Status: SHIPPED | OUTPATIENT
Start: 2020-05-09 | End: 2020-05-14

## 2020-05-09 RX ADMIN — KETOROLAC TROMETHAMINE 60 MG: 30 INJECTION, SOLUTION INTRAMUSCULAR; INTRAVENOUS at 16:11

## 2020-05-09 ASSESSMENT — ENCOUNTER SYMPTOMS
MYALGIAS: 1
SENSORY CHANGE: 0
BRUISES/BLEEDS EASILY: 0
TINGLING: 0
FEVER: 0
CHILLS: 0
FALLS: 1

## 2020-05-09 NOTE — PROGRESS NOTES
"Subjective:      Candy Dey is a 40 y.o. female who presents with Wrist Injury (Fell off ladder today,possibly landded on right arm, right wrist painfull) and Ankle Injury (right ankle pain)            HPI  States fell from ladder while hanging a picture at home. States fell from 14 ft. States ladder fell over and she fell with it. Ladder fell on left ankle. Denies hitting head. Witnessed fall. No NSAID or ice use. No previous injury to wrist or ankle. States severe pain. States unable to tell if numbness/tingling due to pain level.    PMH:  has no past medical history on file.  MEDS:   Current Outpatient Medications:   •  Evolocumab (REPATHA SC), Inject  as instructed., Disp: , Rfl:   •  alprazolam (XANAX) 0.5 MG Tab, Take 0.5-1 mg by mouth every evening., Disp: , Rfl:   •  amphetamine-dextroamphetamine (ADDERALL) 20 MG Tab, Take 20 mg by mouth 2 times a day. Indications: takes as needed (normally takes one \"regularly\"), Disp: , Rfl:   •  atorvastatin (LIPITOR) 20 MG Tab, Take 80 mg by mouth every evening., Disp: , Rfl:   •  estradiol (ESTRACE) 1 MG Tab, Take 1 mg by mouth 2 Times a Day. Indications: 1/2 in am and full tablet at night., Disp: , Rfl:   •  cefdinir (OMNICEF) 300 MG Cap, Take 1 Cap by mouth 2 times a day. (Patient not taking: Reported on 5/9/2020), Disp: 20 Cap, Rfl: 0  •  fluconazole (DIFLUCAN) 150 MG tablet, Take 1 Tab by mouth every day. (Patient not taking: Reported on 5/9/2020), Disp: 1 Tab, Rfl: 0  •  benzonatate (TESSALON) 200 MG capsule, Take 1 Cap by mouth 3 times a day as needed for Cough. (Patient not taking: Reported on 5/9/2020), Disp: 60 Cap, Rfl: 0  •  silver sulfADIAZINE (SILVADENE) 1 % Cream, Apply 1 g to affected area(s) every day. (Patient not taking: Reported on 3/21/2019), Disp: 60 g, Rfl: 0  •  aspirin (ASA) 325 MG Tab, Take 1 Tab by mouth every day., Disp: 100 Tab, Rfl: 0    Current Facility-Administered Medications:   •  ketorolac (TORADOL) injection 60 mg, 60 mg, " Intramuscular, Once, JOSEFA Delong  ALLERGIES: No Known Allergies  SURGHX: History reviewed. No pertinent surgical history.  SOCHX:  reports that she has been smoking cigarettes. She has never used smokeless tobacco. She reports current alcohol use. She reports current drug use. Drug: Oral.  FH: Family history was reviewed, no pertinent findings to report    Review of Systems   Constitutional: Negative for chills, fever and malaise/fatigue.   Musculoskeletal: Positive for falls, joint pain and myalgias.   Neurological: Negative for tingling and sensory change.   Endo/Heme/Allergies: Does not bruise/bleed easily.   All other systems reviewed and are negative.         Objective:     /74 (BP Location: Right arm, Patient Position: Sitting, BP Cuff Size: Large adult)   Pulse 87   Temp 37 °C (98.6 °F) (Temporal)   Resp 16   SpO2 97%      Physical Exam  Vitals signs reviewed.   Constitutional:       General: She is awake. She is not in acute distress.     Appearance: Normal appearance. She is well-developed. She is not ill-appearing, toxic-appearing or diaphoretic.   HENT:      Head: Normocephalic.   Eyes:      Pupils: Pupils are equal, round, and reactive to light.   Neck:      Musculoskeletal: Normal range of motion and neck supple.   Cardiovascular:      Rate and Rhythm: Normal rate.   Pulmonary:      Effort: Pulmonary effort is normal. No accessory muscle usage or respiratory distress.   Musculoskeletal:         General: Swelling, tenderness, deformity and signs of injury present.      Left wrist: She exhibits decreased range of motion, tenderness, bony tenderness, swelling and deformity.      Left ankle: She exhibits decreased range of motion. She exhibits no swelling, no ecchymosis, no deformity, no laceration and normal pulse. Tenderness. Medial malleolus tenderness found. Achilles tendon normal.      Left foot: Decreased range of motion. No deformity.      Comments: Deformity of bone of  radial aspect. No redness, bruising seen. Unable to move left wrist at all and will hang arm downward due to pain when lifted. Unable to grasp or close fist due to pain with any movement.   Feet:      Left foot:      Protective Sensation: 10 sites tested. 10 sites sensed.      Skin integrity: No ulcer, blister, skin breakdown, erythema, warmth, callus, dry skin or fissure.   Skin:     General: Skin is warm and dry.      Findings: Abrasion present. No erythema or rash.             Comments: Abrasion to medial aspect of dorsal side of left ankle. Cleaned with dilute hibiclens and saline. Dressed with TAb ointment, nonadhering bandage, coban.   Neurological:      Mental Status: She is alert and oriented to person, place, and time.      GCS: GCS eye subscore is 4. GCS verbal subscore is 5. GCS motor subscore is 6.      Cranial Nerves: Cranial nerves are intact.      Motor: Motor function is intact.      Coordination: Coordination is intact.      Gait: Gait is intact.   Psychiatric:         Attention and Perception: Attention and perception normal.         Mood and Affect: Mood and affect normal.         Speech: Speech normal.         Behavior: Behavior normal. Behavior is cooperative.         Thought Content: Thought content normal.         Cognition and Memory: Cognition and memory normal.         Judgment: Judgment normal.      Comments: Tearful due to pain.          Wrist xray:  Comminuted distal radial metaphysis fracture without significant displacement.   Ankle xray:  Linear lucency in the distal fibula could relate to nondisplaced fracture    [facility] Aware web site evaluation: I have obtained and reviewed patient utilization report from [facility] pharmacy database prior to writing prescription for controlled substance II, III or IV. Based on the report and my clinical assessment the prescription is medically necessary    Med assistant applied reverse sugar tong orthoglass splint to left wrist  Also, applied  walking boot to left foot   Assessment/Plan:       1. Fall from ladder, initial encounter    - ketorolac (TORADOL) injection 60 mg  - DX-WRIST-COMPLETE 3+ LEFT; Future  - DX-ANKLE 3+ VIEWS LEFT; Future    2. Wrist injury, left, initial encounter    - ketorolac (TORADOL) injection 60 mg  - DX-WRIST-COMPLETE 3+ LEFT; Future    3. Ankle injury, left, initial encounter    - DX-ANKLE 3+ VIEWS LEFT; Future    4. Other closed fracture of distal end of left radius, initial encounter    - HYDROcodone-acetaminophen (NORCO) 7.5-325 MG per tablet; Take 1 Tab by mouth every 6 hours as needed for up to 5 days. Causes drowsiness, do not drive or work while using.  Dispense: 20 Tab; Refill: 0  - REFERRAL TO ORTHOPEDICS    5. Other closed fracture of distal end of left fibula, initial encounter    - HYDROcodone-acetaminophen (NORCO) 7.5-325 MG per tablet; Take 1 Tab by mouth every 6 hours as needed for up to 5 days. Causes drowsiness, do not drive or work while using.  Dispense: 20 Tab; Refill: 0  - REFERRAL TO ORTHOPEDICS    May use NSAID prn for pain/swelling  May use cool compresses for anyswelling prn  May utilize RICE method prn  Avoid excessive weight bearing to avoid further injury  May apply topical analgesics prn  Perform proper body mechanics with lifting, twisting, bending and walking  Monitor for deformity, numbness/tingling in fingers or toess, decreased ROM with weight bearing- need re-evaluation  F/u orthopedics

## 2020-05-29 ENCOUNTER — HOSPITAL ENCOUNTER (EMERGENCY)
Facility: MEDICAL CENTER | Age: 40
End: 2020-05-29
Attending: EMERGENCY MEDICINE | Admitting: EMERGENCY MEDICINE
Payer: COMMERCIAL

## 2020-05-29 ENCOUNTER — HOSPITAL ENCOUNTER (OUTPATIENT)
Facility: MEDICAL CENTER | Age: 40
End: 2020-05-31
Attending: EMERGENCY MEDICINE | Admitting: INTERNAL MEDICINE
Payer: COMMERCIAL

## 2020-05-29 VITALS
HEART RATE: 62 BPM | TEMPERATURE: 98.2 F | OXYGEN SATURATION: 97 % | RESPIRATION RATE: 16 BRPM | BODY MASS INDEX: 25.05 KG/M2 | HEIGHT: 67 IN | SYSTOLIC BLOOD PRESSURE: 120 MMHG | WEIGHT: 159.61 LBS | DIASTOLIC BLOOD PRESSURE: 77 MMHG

## 2020-05-29 DIAGNOSIS — B96.89 BACTERIAL VAGINOSIS: ICD-10-CM

## 2020-05-29 DIAGNOSIS — M79.10 MYALGIA: ICD-10-CM

## 2020-05-29 DIAGNOSIS — N89.8 VAGINAL DISCHARGE: ICD-10-CM

## 2020-05-29 DIAGNOSIS — T46.6X5A STATIN MYOPATHY: ICD-10-CM

## 2020-05-29 DIAGNOSIS — N76.0 BACTERIAL VAGINOSIS: ICD-10-CM

## 2020-05-29 DIAGNOSIS — G72.0 STATIN MYOPATHY: ICD-10-CM

## 2020-05-29 DIAGNOSIS — R74.8 ELEVATED CK: ICD-10-CM

## 2020-05-29 DIAGNOSIS — F11.93 ACUTE NARCOTIC WITHDRAWAL (HCC): ICD-10-CM

## 2020-05-29 PROBLEM — R73.9 HYPERGLYCEMIA: Status: ACTIVE | Noted: 2020-05-29

## 2020-05-29 PROBLEM — E78.49 FAMILIAL HYPERLIPIDEMIA: Status: ACTIVE | Noted: 2020-05-29

## 2020-05-29 PROBLEM — R52 INTRACTABLE PAIN: Status: ACTIVE | Noted: 2020-05-29

## 2020-05-29 PROBLEM — R00.1 BRADYCARDIA: Status: ACTIVE | Noted: 2020-05-29

## 2020-05-29 PROBLEM — M60.9 STATIN-INDUCED MYOSITIS: Status: ACTIVE | Noted: 2020-05-29

## 2020-05-29 LAB
ALBUMIN SERPL BCP-MCNC: 4.8 G/DL (ref 3.2–4.9)
ALBUMIN/GLOB SERPL: 1.8 G/DL
ALP SERPL-CCNC: 97 U/L (ref 30–99)
ALT SERPL-CCNC: 48 U/L (ref 2–50)
ANION GAP SERPL CALC-SCNC: 14 MMOL/L (ref 7–16)
ANION GAP SERPL CALC-SCNC: 15 MMOL/L (ref 7–16)
APPEARANCE UR: CLEAR
AST SERPL-CCNC: 30 U/L (ref 12–45)
BACTERIA #/AREA URNS HPF: ABNORMAL /HPF
BACTERIA GENITAL QL WET PREP: NORMAL
BASOPHILS # BLD AUTO: 0.3 % (ref 0–1.8)
BASOPHILS # BLD AUTO: 0.4 % (ref 0–1.8)
BASOPHILS # BLD: 0.03 K/UL (ref 0–0.12)
BASOPHILS # BLD: 0.03 K/UL (ref 0–0.12)
BILIRUB SERPL-MCNC: 0.4 MG/DL (ref 0.1–1.5)
BILIRUB UR QL STRIP.AUTO: NEGATIVE
BUN SERPL-MCNC: 11 MG/DL (ref 8–22)
BUN SERPL-MCNC: 16 MG/DL (ref 8–22)
C TRACH DNA SPEC QL NAA+PROBE: NEGATIVE
CALCIUM SERPL-MCNC: 9.4 MG/DL (ref 8.4–10.2)
CALCIUM SERPL-MCNC: 9.8 MG/DL (ref 8.4–10.2)
CHLORIDE SERPL-SCNC: 102 MMOL/L (ref 96–112)
CHLORIDE SERPL-SCNC: 105 MMOL/L (ref 96–112)
CK SERPL-CCNC: 177 U/L (ref 0–154)
CK SERPL-CCNC: 213 U/L (ref 0–154)
CO2 SERPL-SCNC: 20 MMOL/L (ref 20–33)
CO2 SERPL-SCNC: 21 MMOL/L (ref 20–33)
COLOR UR: YELLOW
CREAT SERPL-MCNC: 0.68 MG/DL (ref 0.5–1.4)
CREAT SERPL-MCNC: 0.69 MG/DL (ref 0.5–1.4)
CRP SERPL HS-MCNC: 0.06 MG/DL (ref 0–0.75)
EOSINOPHIL # BLD AUTO: 0.02 K/UL (ref 0–0.51)
EOSINOPHIL # BLD AUTO: 0.06 K/UL (ref 0–0.51)
EOSINOPHIL NFR BLD: 0.2 % (ref 0–6.9)
EOSINOPHIL NFR BLD: 0.9 % (ref 0–6.9)
EPI CELLS #/AREA URNS HPF: ABNORMAL /HPF
ERYTHROCYTE [DISTWIDTH] IN BLOOD BY AUTOMATED COUNT: 42.1 FL (ref 35.9–50)
ERYTHROCYTE [DISTWIDTH] IN BLOOD BY AUTOMATED COUNT: 42.3 FL (ref 35.9–50)
ERYTHROCYTE [SEDIMENTATION RATE] IN BLOOD BY WESTERGREN METHOD: 5 MM/HOUR (ref 0–20)
GLOBULIN SER CALC-MCNC: 2.7 G/DL (ref 1.9–3.5)
GLUCOSE SERPL-MCNC: 125 MG/DL (ref 65–99)
GLUCOSE SERPL-MCNC: 137 MG/DL (ref 65–99)
GLUCOSE UR STRIP.AUTO-MCNC: NEGATIVE MG/DL
HCT VFR BLD AUTO: 45.1 % (ref 37–47)
HCT VFR BLD AUTO: 45.6 % (ref 37–47)
HGB BLD-MCNC: 15.3 G/DL (ref 12–16)
HGB BLD-MCNC: 15.5 G/DL (ref 12–16)
IMM GRANULOCYTES # BLD AUTO: 0.02 K/UL (ref 0–0.11)
IMM GRANULOCYTES # BLD AUTO: 0.03 K/UL (ref 0–0.11)
IMM GRANULOCYTES NFR BLD AUTO: 0.3 % (ref 0–0.9)
IMM GRANULOCYTES NFR BLD AUTO: 0.3 % (ref 0–0.9)
KETONES UR STRIP.AUTO-MCNC: NEGATIVE MG/DL
LEUKOCYTE ESTERASE UR QL STRIP.AUTO: NEGATIVE
LYMPHOCYTES # BLD AUTO: 1.57 K/UL (ref 1–4.8)
LYMPHOCYTES # BLD AUTO: 1.62 K/UL (ref 1–4.8)
LYMPHOCYTES NFR BLD: 18.8 % (ref 22–41)
LYMPHOCYTES NFR BLD: 23.3 % (ref 22–41)
MCH RBC QN AUTO: 31.5 PG (ref 27–33)
MCH RBC QN AUTO: 31.6 PG (ref 27–33)
MCHC RBC AUTO-ENTMCNC: 33.9 G/DL (ref 33.6–35)
MCHC RBC AUTO-ENTMCNC: 34 G/DL (ref 33.6–35)
MCV RBC AUTO: 93 FL (ref 81.4–97.8)
MCV RBC AUTO: 93.1 FL (ref 81.4–97.8)
MICRO URNS: ABNORMAL
MONOCYTES # BLD AUTO: 0.35 K/UL (ref 0–0.85)
MONOCYTES # BLD AUTO: 0.37 K/UL (ref 0–0.85)
MONOCYTES NFR BLD AUTO: 4.3 % (ref 0–13.4)
MONOCYTES NFR BLD AUTO: 5.2 % (ref 0–13.4)
N GONORRHOEA DNA SPEC QL NAA+PROBE: NEGATIVE
NEUTROPHILS # BLD AUTO: 4.71 K/UL (ref 2–7.15)
NEUTROPHILS # BLD AUTO: 6.54 K/UL (ref 2–7.15)
NEUTROPHILS NFR BLD: 69.9 % (ref 44–72)
NEUTROPHILS NFR BLD: 76.1 % (ref 44–72)
NITRITE UR QL STRIP.AUTO: NEGATIVE
NRBC # BLD AUTO: 0 K/UL
NRBC # BLD AUTO: 0 K/UL
NRBC BLD-RTO: 0 /100 WBC
NRBC BLD-RTO: 0 /100 WBC
PH UR STRIP.AUTO: 5.5 [PH] (ref 5–8)
PLATELET # BLD AUTO: 295 K/UL (ref 164–446)
PLATELET # BLD AUTO: 309 K/UL (ref 164–446)
PMV BLD AUTO: 10.1 FL (ref 9–12.9)
PMV BLD AUTO: 9.8 FL (ref 9–12.9)
POTASSIUM SERPL-SCNC: 3.7 MMOL/L (ref 3.6–5.5)
POTASSIUM SERPL-SCNC: 4.2 MMOL/L (ref 3.6–5.5)
PROT SERPL-MCNC: 7.5 G/DL (ref 6–8.2)
PROT UR QL STRIP: NEGATIVE MG/DL
RBC # BLD AUTO: 4.85 M/UL (ref 4.2–5.4)
RBC # BLD AUTO: 4.9 M/UL (ref 4.2–5.4)
RBC # URNS HPF: ABNORMAL /HPF
RBC UR QL AUTO: ABNORMAL
SIGNIFICANT IND 70042: NORMAL
SITE SITE: NORMAL
SODIUM SERPL-SCNC: 138 MMOL/L (ref 135–145)
SODIUM SERPL-SCNC: 139 MMOL/L (ref 135–145)
SOURCE SOURCE: NORMAL
SP GR UR STRIP.AUTO: 1.01
SPECIMEN SOURCE: NORMAL
WBC # BLD AUTO: 6.7 K/UL (ref 4.8–10.8)
WBC # BLD AUTO: 8.6 K/UL (ref 4.8–10.8)
WBC #/AREA URNS HPF: ABNORMAL /HPF

## 2020-05-29 PROCEDURE — 96375 TX/PRO/DX INJ NEW DRUG ADDON: CPT

## 2020-05-29 PROCEDURE — 700105 HCHG RX REV CODE 258: Performed by: EMERGENCY MEDICINE

## 2020-05-29 PROCEDURE — 96374 THER/PROPH/DIAG INJ IV PUSH: CPT

## 2020-05-29 PROCEDURE — 82550 ASSAY OF CK (CPK): CPT | Mod: 91

## 2020-05-29 PROCEDURE — 85652 RBC SED RATE AUTOMATED: CPT

## 2020-05-29 PROCEDURE — 87491 CHLMYD TRACH DNA AMP PROBE: CPT

## 2020-05-29 PROCEDURE — 96376 TX/PRO/DX INJ SAME DRUG ADON: CPT

## 2020-05-29 PROCEDURE — 80053 COMPREHEN METABOLIC PANEL: CPT

## 2020-05-29 PROCEDURE — 36415 COLL VENOUS BLD VENIPUNCTURE: CPT

## 2020-05-29 PROCEDURE — 700105 HCHG RX REV CODE 258: Performed by: INTERNAL MEDICINE

## 2020-05-29 PROCEDURE — 99285 EMERGENCY DEPT VISIT HI MDM: CPT

## 2020-05-29 PROCEDURE — 86140 C-REACTIVE PROTEIN: CPT

## 2020-05-29 PROCEDURE — 700111 HCHG RX REV CODE 636 W/ 250 OVERRIDE (IP): Performed by: INTERNAL MEDICINE

## 2020-05-29 PROCEDURE — G0378 HOSPITAL OBSERVATION PER HR: HCPCS

## 2020-05-29 PROCEDURE — 85025 COMPLETE CBC W/AUTO DIFF WBC: CPT | Mod: 91

## 2020-05-29 PROCEDURE — 700102 HCHG RX REV CODE 250 W/ 637 OVERRIDE(OP): Performed by: INTERNAL MEDICINE

## 2020-05-29 PROCEDURE — 82550 ASSAY OF CK (CPK): CPT

## 2020-05-29 PROCEDURE — 81001 URINALYSIS AUTO W/SCOPE: CPT

## 2020-05-29 PROCEDURE — 700111 HCHG RX REV CODE 636 W/ 250 OVERRIDE (IP): Performed by: EMERGENCY MEDICINE

## 2020-05-29 PROCEDURE — 99220 PR INITIAL OBSERVATION CARE,LEVL III: CPT | Performed by: INTERNAL MEDICINE

## 2020-05-29 PROCEDURE — 85025 COMPLETE CBC W/AUTO DIFF WBC: CPT

## 2020-05-29 PROCEDURE — A9270 NON-COVERED ITEM OR SERVICE: HCPCS | Performed by: INTERNAL MEDICINE

## 2020-05-29 PROCEDURE — 80048 BASIC METABOLIC PNL TOTAL CA: CPT

## 2020-05-29 PROCEDURE — 700101 HCHG RX REV CODE 250: Performed by: EMERGENCY MEDICINE

## 2020-05-29 PROCEDURE — 87591 N.GONORRHOEAE DNA AMP PROB: CPT

## 2020-05-29 RX ORDER — ACETAMINOPHEN 325 MG/1
650 TABLET ORAL EVERY 6 HOURS PRN
Status: DISCONTINUED | OUTPATIENT
Start: 2020-05-29 | End: 2020-05-31 | Stop reason: HOSPADM

## 2020-05-29 RX ORDER — HYDROMORPHONE HYDROCHLORIDE 1 MG/ML
1 INJECTION, SOLUTION INTRAMUSCULAR; INTRAVENOUS; SUBCUTANEOUS ONCE
Status: COMPLETED | OUTPATIENT
Start: 2020-05-29 | End: 2020-05-29

## 2020-05-29 RX ORDER — SODIUM CHLORIDE 9 MG/ML
1000 INJECTION, SOLUTION INTRAVENOUS ONCE
Status: COMPLETED | OUTPATIENT
Start: 2020-05-29 | End: 2020-05-29

## 2020-05-29 RX ORDER — OXYCODONE HYDROCHLORIDE 5 MG/1
10 TABLET ORAL
Status: DISCONTINUED | OUTPATIENT
Start: 2020-05-29 | End: 2020-05-30

## 2020-05-29 RX ORDER — METRONIDAZOLE 500 MG/1
500 TABLET ORAL 3 TIMES DAILY
Qty: 21 TAB | Refills: 0 | Status: SHIPPED | OUTPATIENT
Start: 2020-05-29 | End: 2020-06-05

## 2020-05-29 RX ORDER — HYDROMORPHONE HYDROCHLORIDE 1 MG/ML
0.5 INJECTION, SOLUTION INTRAMUSCULAR; INTRAVENOUS; SUBCUTANEOUS ONCE
Status: COMPLETED | OUTPATIENT
Start: 2020-05-29 | End: 2020-05-29

## 2020-05-29 RX ORDER — PROMETHAZINE HYDROCHLORIDE 25 MG/1
12.5-25 SUPPOSITORY RECTAL EVERY 4 HOURS PRN
Status: DISCONTINUED | OUTPATIENT
Start: 2020-05-29 | End: 2020-05-31 | Stop reason: HOSPADM

## 2020-05-29 RX ORDER — BISACODYL 10 MG
10 SUPPOSITORY, RECTAL RECTAL
Status: DISCONTINUED | OUTPATIENT
Start: 2020-05-29 | End: 2020-05-30

## 2020-05-29 RX ORDER — MORPHINE SULFATE 4 MG/ML
4 INJECTION, SOLUTION INTRAMUSCULAR; INTRAVENOUS
Status: DISCONTINUED | OUTPATIENT
Start: 2020-05-29 | End: 2020-05-30

## 2020-05-29 RX ORDER — ONDANSETRON 4 MG/1
4 TABLET, ORALLY DISINTEGRATING ORAL EVERY 4 HOURS PRN
Status: DISCONTINUED | OUTPATIENT
Start: 2020-05-29 | End: 2020-05-31 | Stop reason: HOSPADM

## 2020-05-29 RX ORDER — PROMETHAZINE HYDROCHLORIDE 25 MG/1
12.5-25 TABLET ORAL EVERY 4 HOURS PRN
Status: DISCONTINUED | OUTPATIENT
Start: 2020-05-29 | End: 2020-05-31 | Stop reason: HOSPADM

## 2020-05-29 RX ORDER — PROCHLORPERAZINE EDISYLATE 5 MG/ML
5-10 INJECTION INTRAMUSCULAR; INTRAVENOUS EVERY 4 HOURS PRN
Status: DISCONTINUED | OUTPATIENT
Start: 2020-05-29 | End: 2020-05-31 | Stop reason: HOSPADM

## 2020-05-29 RX ORDER — ATORVASTATIN CALCIUM 80 MG/1
80 TABLET, FILM COATED ORAL NIGHTLY
COMMUNITY
End: 2021-01-06

## 2020-05-29 RX ORDER — ONDANSETRON 2 MG/ML
4 INJECTION INTRAMUSCULAR; INTRAVENOUS ONCE
Status: COMPLETED | OUTPATIENT
Start: 2020-05-29 | End: 2020-05-29

## 2020-05-29 RX ORDER — OXYCODONE HYDROCHLORIDE 5 MG/1
5 TABLET ORAL
Status: DISCONTINUED | OUTPATIENT
Start: 2020-05-29 | End: 2020-05-30

## 2020-05-29 RX ORDER — ALPRAZOLAM 0.5 MG/1
0.5 TABLET ORAL NIGHTLY PRN
Status: DISCONTINUED | OUTPATIENT
Start: 2020-05-29 | End: 2020-05-31 | Stop reason: HOSPADM

## 2020-05-29 RX ORDER — AMOXICILLIN 250 MG
2 CAPSULE ORAL 2 TIMES DAILY
Status: DISCONTINUED | OUTPATIENT
Start: 2020-05-29 | End: 2020-05-30

## 2020-05-29 RX ORDER — HYDROCODONE BITARTRATE AND ACETAMINOPHEN 5; 325 MG/1; MG/1
1 TABLET ORAL EVERY 6 HOURS PRN
Status: ON HOLD | COMMUNITY
End: 2020-05-31 | Stop reason: SDUPTHER

## 2020-05-29 RX ORDER — SODIUM CHLORIDE 9 MG/ML
INJECTION, SOLUTION INTRAVENOUS CONTINUOUS
Status: DISCONTINUED | OUTPATIENT
Start: 2020-05-29 | End: 2020-05-30

## 2020-05-29 RX ORDER — POLYETHYLENE GLYCOL 3350 17 G/17G
1 POWDER, FOR SOLUTION ORAL
Status: DISCONTINUED | OUTPATIENT
Start: 2020-05-29 | End: 2020-05-30

## 2020-05-29 RX ORDER — ONDANSETRON 2 MG/ML
4 INJECTION INTRAMUSCULAR; INTRAVENOUS EVERY 4 HOURS PRN
Status: DISCONTINUED | OUTPATIENT
Start: 2020-05-29 | End: 2020-05-31 | Stop reason: HOSPADM

## 2020-05-29 RX ADMIN — SODIUM CHLORIDE 1000 ML: 9 INJECTION, SOLUTION INTRAVENOUS at 07:18

## 2020-05-29 RX ADMIN — MORPHINE SULFATE 4 MG: 4 INJECTION INTRAVENOUS at 21:19

## 2020-05-29 RX ADMIN — HYDROMORPHONE HYDROCHLORIDE 1 MG: 1 INJECTION, SOLUTION INTRAMUSCULAR; INTRAVENOUS; SUBCUTANEOUS at 07:21

## 2020-05-29 RX ADMIN — HYDROMORPHONE HYDROCHLORIDE 0.5 MG: 1 INJECTION, SOLUTION INTRAMUSCULAR; INTRAVENOUS; SUBCUTANEOUS at 08:28

## 2020-05-29 RX ADMIN — OXYCODONE HYDROCHLORIDE 10 MG: 5 TABLET ORAL at 20:02

## 2020-05-29 RX ADMIN — KETAMINE HYDROCHLORIDE 25 MG: 10 INJECTION, SOLUTION INTRAMUSCULAR; INTRAVENOUS at 16:09

## 2020-05-29 RX ADMIN — HYDROMORPHONE HYDROCHLORIDE 1 MG: 1 INJECTION, SOLUTION INTRAMUSCULAR; INTRAVENOUS; SUBCUTANEOUS at 18:35

## 2020-05-29 RX ADMIN — ONDANSETRON 4 MG: 2 INJECTION INTRAMUSCULAR; INTRAVENOUS at 07:21

## 2020-05-29 RX ADMIN — SODIUM CHLORIDE: 9 INJECTION, SOLUTION INTRAVENOUS at 21:02

## 2020-05-29 RX ADMIN — SENNOSIDES AND DOCUSATE SODIUM 2 TABLET: 8.6; 5 TABLET ORAL at 20:03

## 2020-05-29 ASSESSMENT — ENCOUNTER SYMPTOMS
HEADACHES: 0
WEAKNESS: 1
NAUSEA: 0
NECK PAIN: 0
ABDOMINAL PAIN: 0
DIARRHEA: 0
TREMORS: 0
SPEECH CHANGE: 0
CONSTIPATION: 0
VOMITING: 0
FALLS: 0
BLURRED VISION: 0
PND: 0
FEVER: 0
SHORTNESS OF BREATH: 0
CHILLS: 0
DEPRESSION: 0
SEIZURES: 0
PALPITATIONS: 0
WHEEZING: 0
EYE PAIN: 0
WEIGHT LOSS: 0
HEARTBURN: 0
COUGH: 0
BACK PAIN: 0
DIZZINESS: 0
SPUTUM PRODUCTION: 0

## 2020-05-29 ASSESSMENT — COPD QUESTIONNAIRES
HAVE YOU SMOKED AT LEAST 100 CIGARETTES IN YOUR ENTIRE LIFE: YES
COPD SCREENING SCORE: 2
DURING THE PAST 4 WEEKS HOW MUCH DID YOU FEEL SHORT OF BREATH: NONE/LITTLE OF THE TIME
DO YOU EVER COUGH UP ANY MUCUS OR PHLEGM?: NO/ONLY WITH OCCASIONAL COLDS OR INFECTIONS
IN THE PAST 12 MONTHS DO YOU DO LESS THAN YOU USED TO BECAUSE OF YOUR BREATHING PROBLEMS: DISAGREE/UNSURE

## 2020-05-29 ASSESSMENT — LIFESTYLE VARIABLES
HAVE YOU EVER FELT YOU SHOULD CUT DOWN ON YOUR DRINKING: NO
SUBSTANCE_ABUSE: 0
HAVE PEOPLE ANNOYED YOU BY CRITICIZING YOUR DRINKING: NO
CONSUMPTION TOTAL: NEGATIVE
EVER_SMOKED: YES
ALCOHOL_USE: YES
ON A TYPICAL DAY WHEN YOU DRINK ALCOHOL HOW MANY DRINKS DO YOU HAVE: 1
TOTAL SCORE: 0
TOTAL SCORE: 0
EVER FELT BAD OR GUILTY ABOUT YOUR DRINKING: NO
HOW MANY TIMES IN THE PAST YEAR HAVE YOU HAD 5 OR MORE DRINKS IN A DAY: 0
TOTAL SCORE: 0
EVER HAD A DRINK FIRST THING IN THE MORNING TO STEADY YOUR NERVES TO GET RID OF A HANGOVER: NO
AVERAGE NUMBER OF DAYS PER WEEK YOU HAVE A DRINK CONTAINING ALCOHOL: 0

## 2020-05-29 ASSESSMENT — FIBROSIS 4 INDEX
FIB4 SCORE: 0.57
FIB4 SCORE: 0.59

## 2020-05-29 ASSESSMENT — PATIENT HEALTH QUESTIONNAIRE - PHQ9
1. LITTLE INTEREST OR PLEASURE IN DOING THINGS: NOT AT ALL
2. FEELING DOWN, DEPRESSED, IRRITABLE, OR HOPELESS: NOT AT ALL
SUM OF ALL RESPONSES TO PHQ9 QUESTIONS 1 AND 2: 0

## 2020-05-29 NOTE — ED PROVIDER NOTES
ED Provider Note    CHIEF COMPLAINT  Chief Complaint   Patient presents with   • Body Aches     for since yesterday morning. denies sob, cough, denies vomiting. endorses nausea and chills/hot flashes       HPI  Candy Dey is a 40 y.o. female who presents to the emergency department with body aches.  The patient describes diffuse body aches from head to toe.  She describes as a deep aching sensation throughout.  Pain started yesterday.  She describes it as severe.  She is never had anything like this.  She has had no change in activity.  No change in medications.  Denies fevers, chills, cough, runny nose or other infectious symptoms.  The patient does take a statin.    Patient has a second chief complaint.  She is also noted that she has had some vaginal burning and discharge.  This been going on for last week or so.  She notes that she was recently on an antibiotic after an injury from falling off of a ladder.    REVIEW OF SYSTEMS  See HPI for further details. All other systems are negative.     PAST MEDICAL HISTORY  Past Medical History:   Diagnosis Date   • Hyperlipidemia        FAMILY HISTORY  History reviewed. No pertinent family history.    SOCIAL HISTORY  Social History     Socioeconomic History   • Marital status: Single     Spouse name: Not on file   • Number of children: Not on file   • Years of education: Not on file   • Highest education level: Not on file   Occupational History   • Not on file   Social Needs   • Financial resource strain: Not on file   • Food insecurity     Worry: Not on file     Inability: Not on file   • Transportation needs     Medical: Not on file     Non-medical: Not on file   Tobacco Use   • Smoking status: Former Smoker     Types: Cigarettes     Last attempt to quit: 2009     Years since quittin.0   • Smokeless tobacco: Never Used   Substance and Sexual Activity   • Alcohol use: Yes     Comment: occasional   • Drug use: Yes     Types: Oral     Comment: marijuana  "drops while on vacation   • Sexual activity: Not on file   Lifestyle   • Physical activity     Days per week: Not on file     Minutes per session: Not on file   • Stress: Not on file   Relationships   • Social connections     Talks on phone: Not on file     Gets together: Not on file     Attends Worship service: Not on file     Active member of club or organization: Not on file     Attends meetings of clubs or organizations: Not on file     Relationship status: Not on file   • Intimate partner violence     Fear of current or ex partner: Not on file     Emotionally abused: Not on file     Physically abused: Not on file     Forced sexual activity: Not on file   Other Topics Concern   • Not on file   Social History Narrative   • Not on file       SURGICAL HISTORY  History reviewed. No pertinent surgical history.    CURRENT MEDICATIONS  Home Medications     Reviewed by Home Pena (Pharmacy Tech) on 05/29/20 at 0728  Med List Status: Unable to Obtain   Medication Last Dose Status   alprazolam (XANAX) 0.5 MG Tab 5/28/2020 Active   amphetamine-dextroamphetamine (ADDERALL) 20 MG Tab 5/9/20 Active   aspirin (ASA) 325 MG Tab  Active   atorvastatin (LIPITOR) 20 MG Tab 5/28/2020 Active   estradiol (ESTRACE) 1 MG Tab  Active   Evolocumab (REPATHA SC) 5/17/2020 Active   fluconazole (DIFLUCAN) 150 MG tablet  Active   silver sulfADIAZINE (SILVADENE) 1 % Cream  Active                ALLERGIES  No Known Allergies    PHYSICAL EXAM  VITAL SIGNS: /77   Pulse 62   Temp 36.8 °C (98.2 °F) (Temporal)   Resp 16   Ht 1.702 m (5' 7\")   Wt 72.4 kg (159 lb 9.8 oz)   SpO2 97%   BMI 25.00 kg/m²    Constitutional: Well developed, Well nourished, No acute distress, Non-toxic appearance.   HENT: Normocephalic, Atraumatic, Bilateral external ears normal, Oropharynx moist, No oral exudates, Nose normal.   Eyes: PERRLA, EOMI, Conjunctiva normal, No discharge.   Neck: Normal range of motion, No tenderness, Supple, No stridor. "   Cardiovascular: Regular rate and rhythm, no audible murmur  Thorax & Lungs: There to auscultation bilaterally.  No rales, rhonchi, wheezing.  Abdomen: Bowel sounds normal, Soft, No tenderness, No masses, No pulsatile masses.   : Normal female external genitalia, scant whitish discharge in the vault.  No cervical motion tenderness.  Skin: Warm, Dry, No erythema, No rash.   Back: No tenderness, No CVA tenderness.   Extremities: Intact distal pulses, mild diffuse tenderness of all extremities without any specific localized tenderness, no erythema, no joint effusions, No cyanosis, No clubbing.  Neurologic: Alert & oriented x 3, Normal motor function, Normal sensory function, No focal deficits noted.     EKG      RADIOLOGY/PROCEDURES  Results for orders placed or performed during the hospital encounter of 05/29/20   CBC WITH DIFFERENTIAL   Result Value Ref Range    WBC 6.7 4.8 - 10.8 K/uL    RBC 4.90 4.20 - 5.40 M/uL    Hemoglobin 15.5 12.0 - 16.0 g/dL    Hematocrit 45.6 37.0 - 47.0 %    MCV 93.1 81.4 - 97.8 fL    MCH 31.6 27.0 - 33.0 pg    MCHC 34.0 33.6 - 35.0 g/dL    RDW 42.1 35.9 - 50.0 fL    Platelet Count 309 164 - 446 K/uL    MPV 10.1 9.0 - 12.9 fL    Neutrophils-Polys 69.90 44.00 - 72.00 %    Lymphocytes 23.30 22.00 - 41.00 %    Monocytes 5.20 0.00 - 13.40 %    Eosinophils 0.90 0.00 - 6.90 %    Basophils 0.40 0.00 - 1.80 %    Immature Granulocytes 0.30 0.00 - 0.90 %    Nucleated RBC 0.00 /100 WBC    Neutrophils (Absolute) 4.71 2.00 - 7.15 K/uL    Lymphs (Absolute) 1.57 1.00 - 4.80 K/uL    Monos (Absolute) 0.35 0.00 - 0.85 K/uL    Eos (Absolute) 0.06 0.00 - 0.51 K/uL    Baso (Absolute) 0.03 0.00 - 0.12 K/uL    Immature Granulocytes (abs) 0.02 0.00 - 0.11 K/uL    NRBC (Absolute) 0.00 K/uL   BASIC METABOLIC PANEL   Result Value Ref Range    Sodium 138 135 - 145 mmol/L    Potassium 4.2 3.6 - 5.5 mmol/L    Chloride 102 96 - 112 mmol/L    Co2 21 20 - 33 mmol/L    Glucose 125 (H) 65 - 99 mg/dL    Bun 16 8 - 22 mg/dL     Creatinine 0.68 0.50 - 1.40 mg/dL    Calcium 9.4 8.4 - 10.2 mg/dL    Anion Gap 15.0 7.0 - 16.0   CREATINE KINASE   Result Value Ref Range    CPK Total 213 (H) 0 - 154 U/L   ESTIMATED GFR   Result Value Ref Range    GFR If African American >60 >60 mL/min/1.73 m 2    GFR If Non African American >60 >60 mL/min/1.73 m 2   URINALYSIS,CULTURE IF INDICATED    Specimen: Urine, Clean Catch   Result Value Ref Range    Color Yellow     Character Clear     Specific Gravity 1.010 <1.035    Ph 5.5 5.0 - 8.0    Glucose Negative Negative mg/dL    Ketones Negative Negative mg/dL    Protein Negative Negative mg/dL    Bilirubin Negative Negative    Nitrite Negative Negative    Leukocyte Esterase Negative Negative    Occult Blood Trace (A) Negative    Micro Urine Req Microscopic    WET PREP    Specimen: Vaginal; Genital   Result Value Ref Range    Significant Indicator NEG     Source GEN     Site VAGINAL     Wet Prep For Parasites       No yeast.  No motile Trichomonas seen.  Rare clue cells seen.  Rare WBCs seen.     CHLAMYDIA & GC BY PCR    Specimen: Genital   Result Value Ref Range    Source Vaginal    URINE MICROSCOPIC (W/UA)   Result Value Ref Range    WBC 0-2 /hpf    RBC Rare /hpf    Bacteria Rare (A) None /hpf    Epithelial Cells Rare Few /hpf         COURSE & MEDICAL DECISION MAKING  Pertinent Labs & Imaging studies reviewed. (See chart for details)    Patient presents today with 2 chief complaints.  #1 is body aches and #2 is vaginal discharge.    The patient takes a statin.  I am concerned for statin induced myopathy/myalgia.  An IV is established.  Laboratory studies are obtained.  CBC and metabolic panel are unremarkable.  CK is obtained.  CK is mildly elevated at 213.  I feel her overall presentation is likely consistent with a statin induced myopathy.  I will have her discontinue this medication and see if her symptoms improve.  She will follow-up with her primary care provider to discuss ongoing lipid  management.    Regarding her vaginal discharge.  Wet prep demonstrates clue cells.  Patient likely has bacterial vaginosis.  She will be treated with Flagyl.  Prescription is provided.  Instructed not to use alcohol while taking this medication.  She will follow-up with her primary care provider.    The patient will return for new or worsening symptoms and is stable at the time of discharge.    The patient is referred to a primary physician for blood pressure management, diabetic screening, and for all other preventative health concerns.    DISPOSITION:  Patient will be discharged home in stable condition.    FOLLOW UP:  [facility], Emergency Dept  [address]  [facility] [address]  [phone]    If symptoms worsen    Jose Saldana M.D.  [address] [address]  [phone]    Schedule an appointment as soon as possible for a visit         OUTPATIENT MEDICATIONS:  Discharge Medication List as of 5/29/2020  9:25 AM      START taking these medications    Details   metroNIDAZOLE (FLAGYL) 500 MG Tab Take 1 Tab by mouth 3 times a day for 7 days., Disp-21 Tab,R-0, Normal               FINAL IMPRESSION  1. Myalgia    2. Bacterial vaginosis    3. Vaginal discharge    4. Statin myopathy              Electronically signed by: Sandip Villatoro M.D., 5/29/2020 9:38 AM

## 2020-05-29 NOTE — ED PROVIDER NOTES
ED Provider Note    ER PROVIDER NOTE        CHIEF COMPLAINT  Chief Complaint   Patient presents with   • Body Aches     pt was seen here this morning for body aches. pt c/o pain not better even after taking norco at home.        HPI  Candy Dey is a 40 y.o. female who presents to the emergency department complaining of body aches.  Patient reports she is having aching all over her body all over essentially from her head to her feet.  It began last night, and is continued.  She was seen here this morning states she never really felt better and discharged.  She denies any injuries, denies any fevers or chills.  Denies any real focal complaints of pain it is just pain all over.  Is had some associated nausea although no vomiting.  Patient did have a fall from a ladder 3 weeks ago with some resultant orthopedic injuries although these appear to have been healing well    She tried taking her Norco at home but this did not help    She does take a statin    REVIEW OF SYSTEMS  Pertinent positives include body aches. Pertinent negatives include no fever. See HPI for details. All other systems reviewed and are negative.    PAST MEDICAL HISTORY   has a past medical history of Hyperlipidemia.    SURGICAL HISTORY  patient denies any surgical history    FAMILY HISTORY  History reviewed. No pertinent family history.    SOCIAL HISTORY  Social History     Socioeconomic History   • Marital status: Single     Spouse name: Not on file   • Number of children: Not on file   • Years of education: Not on file   • Highest education level: Not on file   Occupational History   • Not on file   Social Needs   • Financial resource strain: Not on file   • Food insecurity     Worry: Not on file     Inability: Not on file   • Transportation needs     Medical: Not on file     Non-medical: Not on file   Tobacco Use   • Smoking status: Former Smoker     Types: Cigarettes     Last attempt to quit: 2009     Years since quittin.0   •  Smokeless tobacco: Never Used   Substance and Sexual Activity   • Alcohol use: Yes     Comment: occasional   • Drug use: Yes     Types: Oral     Comment: marijuana drops while on vacation   • Sexual activity: Not on file   Lifestyle   • Physical activity     Days per week: Not on file     Minutes per session: Not on file   • Stress: Not on file   Relationships   • Social connections     Talks on phone: Not on file     Gets together: Not on file     Attends Zoroastrianism service: Not on file     Active member of club or organization: Not on file     Attends meetings of clubs or organizations: Not on file     Relationship status: Not on file   • Intimate partner violence     Fear of current or ex partner: Not on file     Emotionally abused: Not on file     Physically abused: Not on file     Forced sexual activity: Not on file   Other Topics Concern   • Not on file   Social History Narrative   • Not on file      Social History     Substance and Sexual Activity   Drug Use Yes   • Types: Oral    Comment: marijuana drops while on vacation       CURRENT MEDICATIONS  Home Medications     Reviewed by Home Pena (Pharmacy Tech) on 05/29/20 at 1710  Med List Status: Complete   Medication Last Dose Status   alprazolam (XANAX) 0.5 MG Tab 5/28/2020 Active   atorvastatin (LIPITOR) 80 MG tablet 5/28/2020 Active   Evolocumab (REPATHA SC) 5/17/2020 Active   HYDROcodone-acetaminophen (NORCO) 5-325 MG Tab per tablet 5/28/2020 Active   metroNIDAZOLE (FLAGYL) 500 MG Tab 5/29/2020 Active                ALLERGIES  No Known Allergies    PHYSICAL EXAM  VITAL SIGNS: /68   Pulse (!) 51   Temp 36.6 °C (97.8 °F) (Temporal)   Resp 16   SpO2 97%   Pulse ox interpretation: I interpret this pulse ox as normal.    Constitutional: Alert uncomfortable, anxious appearing  HENT: No signs of trauma, Bilateral external ears normal, Nose normal.   Eyes: Pupils are equal and reactive, Conjunctiva normal, Non-icteric.   Neck: Normal range  of motion, No tenderness, Supple, No stridor.   Lymphatic: No lymphadenopathy noted.   Cardiovascular: Regular rate and rhythm, no murmurs.   Thorax & Lungs: Normal breath sounds, No respiratory distress, No wheezing, No chest tenderness.   Abdomen: Bowel sounds normal, Soft, No tenderness, No masses, No pulsatile masses. No peritoneal signs.  Skin: Warm, Dry, No erythema, No rash.   Back: No bony tenderness, No CVA tenderness.   Extremities: bruising  over the left leg thigh hip area intact distal pulses, No edema, No tenderness, No cyanosis, Negative Arnaldo's sign.  Musculoskeletal: Good range of motion in all major joints. No tenderness to palpation or major deformities noted.   Neurologic: Alert , Normal motor function, Normal sensory function, No focal deficits noted.   Psychiatric: Affect normal, Judgment normal, Mood normal.     DIAGNOSTIC STUDIES / PROCEDURES        LABS  Labs Reviewed   CBC WITH DIFFERENTIAL - Abnormal; Notable for the following components:       Result Value    Neutrophils-Polys 76.10 (*)     Lymphocytes 18.80 (*)     All other components within normal limits   COMP METABOLIC PANEL - Abnormal; Notable for the following components:    Glucose 137 (*)     All other components within normal limits   CREATINE KINASE - Abnormal; Notable for the following components:    CPK Total 177 (*)     All other components within normal limits   ESTIMATED GFR       All labs reviewed by me.    RADIOLOGY  No orders to display     The radiologist's interpretation of all radiological studies have been reviewed by me.    COURSE & MEDICAL DECISION MAKING  Nursing notes, VS, PMSFHx reviewed in chart.    3:52 PM Patient seen and examined at bedside. Patient will be treated with ketamine, IV fluid. Ordered for CBC, CMP, CK to evaluate her symptoms.     5:45 PM  Patient is reevaluated, states pain was much improved although starting to come back, updated on results, additional medication ordered    On reevaluation,  patient is still having pain.  At this point will admit patient for pain control and further evaluation    HYDRATION: Based on the patient's presentation of Tachycardia the patient was given IV fluids. IV Hydration was used because oral hydration was not as rapid as required. Upon recheck following hydration, the patient was improved.     This patient was cared for during the COVID-19 pandemic.  History and physical exam may be limited/truncated by the inherent challenges of PPE and the need to decrease staff exposure to novel coronavirus.  Some aspects of disease management may be different to protect staff and help slow the spread of disease. I verified that, if possible, the patient was wearing a mask and I was wearing appropriate PPE every time I encountered the patient.     Current [facility] COVID19 protocol followed      Decision Making:  This is a 40 y.o. female present with body aches and pain.  It is unclear what is causing her pain at this point although this is her second visit today with poor pain control so she will be admitted for further symptom management and evaluation.  Has a slight elevation in her CK although not to the level that would suggest rhabdomyolysis, and this could be related to her statin use.  She has had no new trauma although she did have some 3 weeks ago not be unusual for this to cause delayed pain 3 weeks out.  No significant electrolyte derangement metabolic derangement    Patient is admitted in stable condition    FINAL IMPRESSION  1. Myalgia    2. Elevated CK          The note accurately reflects work and decisions made by me.  Stewart Gan M.D.  5/29/2020  6:51 PM

## 2020-05-29 NOTE — ED NOTES
Assumed patient care. Pt assesement done.  Plan of care reviewed with patient. Medicinal interventions carried out per ERP orders.

## 2020-05-29 NOTE — ED NOTES
Pelvic exam performed on pt by ERP with female chaperone at the bedside. Privacy maintained. Pt tolerated procedure well. Specimen sent to lab. Pt assisted from lithotomy to supine position after procedure. Warm blanket provided. Call light within reach.

## 2020-05-29 NOTE — ED TRIAGE NOTES
"Bib friend for above complaints.    Chief Complaint   Patient presents with   • Body Aches     for since yesterday morning. denies sob, cough, denies vomiting. endorses nausea and chills/hot flashes     Pulse 72   Temp 36.8 °C (98.2 °F) (Temporal)   Resp 16   Ht 1.702 m (5' 7\")   Wt 72.4 kg (159 lb 9.8 oz)   SpO2 96%   BMI 25.00 kg/m²     "

## 2020-05-29 NOTE — ED TRIAGE NOTES
Chief Complaint   Patient presents with   • Body Aches     pt was seen here this morning for body aches. pt c/o pain not better even after taking norco at home.      /96   Pulse 75   Temp 36.6 °C (97.8 °F) (Temporal)   Resp 16   SpO2 98%

## 2020-05-29 NOTE — DISCHARGE INSTRUCTIONS
Hold your cholesterol at this time. Discuss with your primary care doctor.  Return to ER for worsening pain.

## 2020-05-30 ENCOUNTER — APPOINTMENT (OUTPATIENT)
Dept: RADIOLOGY | Facility: MEDICAL CENTER | Age: 40
End: 2020-05-30
Attending: INTERNAL MEDICINE
Payer: COMMERCIAL

## 2020-05-30 PROBLEM — S82.832A CLOSED FRACTURE OF DISTAL END OF LEFT FIBULA: Status: ACTIVE | Noted: 2020-05-30

## 2020-05-30 LAB
ANION GAP SERPL CALC-SCNC: 12 MMOL/L (ref 7–16)
BASOPHILS # BLD AUTO: 0.9 % (ref 0–1.8)
BASOPHILS # BLD: 0.07 K/UL (ref 0–0.12)
BUN SERPL-MCNC: 8 MG/DL (ref 8–22)
CALCIUM SERPL-MCNC: 9.5 MG/DL (ref 8.4–10.2)
CHLORIDE SERPL-SCNC: 109 MMOL/L (ref 96–112)
CK SERPL-CCNC: 143 U/L (ref 0–154)
CO2 SERPL-SCNC: 17 MMOL/L (ref 20–33)
CREAT SERPL-MCNC: 0.69 MG/DL (ref 0.5–1.4)
EOSINOPHIL # BLD AUTO: 0.02 K/UL (ref 0–0.51)
EOSINOPHIL NFR BLD: 0.3 % (ref 0–6.9)
ERYTHROCYTE [DISTWIDTH] IN BLOOD BY AUTOMATED COUNT: 45.1 FL (ref 35.9–50)
GLUCOSE SERPL-MCNC: 117 MG/DL (ref 65–99)
HCT VFR BLD AUTO: 50.1 % (ref 37–47)
HGB BLD-MCNC: 16.2 G/DL (ref 12–16)
IMM GRANULOCYTES # BLD AUTO: 0.02 K/UL (ref 0–0.11)
IMM GRANULOCYTES NFR BLD AUTO: 0.3 % (ref 0–0.9)
LYMPHOCYTES # BLD AUTO: 2.45 K/UL (ref 1–4.8)
LYMPHOCYTES NFR BLD: 31.1 % (ref 22–41)
MCH RBC QN AUTO: 31.9 PG (ref 27–33)
MCHC RBC AUTO-ENTMCNC: 32.3 G/DL (ref 33.6–35)
MCV RBC AUTO: 98.6 FL (ref 81.4–97.8)
MONOCYTES # BLD AUTO: 0.47 K/UL (ref 0–0.85)
MONOCYTES NFR BLD AUTO: 6 % (ref 0–13.4)
NEUTROPHILS # BLD AUTO: 4.84 K/UL (ref 2–7.15)
NEUTROPHILS NFR BLD: 61.4 % (ref 44–72)
NRBC # BLD AUTO: 0 K/UL
NRBC BLD-RTO: 0 /100 WBC
PLATELET # BLD AUTO: 194 K/UL (ref 164–446)
PMV BLD AUTO: 11.4 FL (ref 9–12.9)
POTASSIUM SERPL-SCNC: 4.5 MMOL/L (ref 3.6–5.5)
RBC # BLD AUTO: 5.08 M/UL (ref 4.2–5.4)
SODIUM SERPL-SCNC: 138 MMOL/L (ref 135–145)
WBC # BLD AUTO: 7.9 K/UL (ref 4.8–10.8)

## 2020-05-30 PROCEDURE — 700105 HCHG RX REV CODE 258: Performed by: INTERNAL MEDICINE

## 2020-05-30 PROCEDURE — 96372 THER/PROPH/DIAG INJ SC/IM: CPT

## 2020-05-30 PROCEDURE — 93970 EXTREMITY STUDY: CPT

## 2020-05-30 PROCEDURE — 700102 HCHG RX REV CODE 250 W/ 637 OVERRIDE(OP): Performed by: INTERNAL MEDICINE

## 2020-05-30 PROCEDURE — 82550 ASSAY OF CK (CPK): CPT

## 2020-05-30 PROCEDURE — 96376 TX/PRO/DX INJ SAME DRUG ADON: CPT

## 2020-05-30 PROCEDURE — 85025 COMPLETE CBC W/AUTO DIFF WBC: CPT

## 2020-05-30 PROCEDURE — 96375 TX/PRO/DX INJ NEW DRUG ADDON: CPT

## 2020-05-30 PROCEDURE — A9270 NON-COVERED ITEM OR SERVICE: HCPCS | Performed by: INTERNAL MEDICINE

## 2020-05-30 PROCEDURE — 99225 PR SUBSEQUENT OBSERVATION CARE,LEVEL II: CPT | Performed by: INTERNAL MEDICINE

## 2020-05-30 PROCEDURE — 36415 COLL VENOUS BLD VENIPUNCTURE: CPT

## 2020-05-30 PROCEDURE — 80048 BASIC METABOLIC PNL TOTAL CA: CPT

## 2020-05-30 PROCEDURE — G0378 HOSPITAL OBSERVATION PER HR: HCPCS

## 2020-05-30 PROCEDURE — 700111 HCHG RX REV CODE 636 W/ 250 OVERRIDE (IP): Performed by: INTERNAL MEDICINE

## 2020-05-30 RX ORDER — AMOXICILLIN 250 MG
2 CAPSULE ORAL 2 TIMES DAILY
Status: DISCONTINUED | OUTPATIENT
Start: 2020-05-30 | End: 2020-05-31 | Stop reason: HOSPADM

## 2020-05-30 RX ORDER — FLUCONAZOLE 200 MG/1
200 TABLET ORAL DAILY
Status: DISCONTINUED | OUTPATIENT
Start: 2020-05-30 | End: 2020-05-31 | Stop reason: HOSPADM

## 2020-05-30 RX ORDER — POLYETHYLENE GLYCOL 3350 17 G/17G
1 POWDER, FOR SOLUTION ORAL DAILY
Status: DISCONTINUED | OUTPATIENT
Start: 2020-05-30 | End: 2020-05-31 | Stop reason: HOSPADM

## 2020-05-30 RX ORDER — HYDROCODONE BITARTRATE AND ACETAMINOPHEN 5; 325 MG/1; MG/1
1 TABLET ORAL EVERY 8 HOURS PRN
Status: DISCONTINUED | OUTPATIENT
Start: 2020-05-30 | End: 2020-05-31 | Stop reason: HOSPADM

## 2020-05-30 RX ADMIN — SENNOSIDES AND DOCUSATE SODIUM 2 TABLET: 8.6; 5 TABLET ORAL at 05:48

## 2020-05-30 RX ADMIN — SODIUM CHLORIDE: 9 INJECTION, SOLUTION INTRAVENOUS at 09:02

## 2020-05-30 RX ADMIN — ENOXAPARIN SODIUM 40 MG: 40 INJECTION SUBCUTANEOUS at 05:48

## 2020-05-30 RX ADMIN — OXYCODONE HYDROCHLORIDE 10 MG: 5 TABLET ORAL at 05:48

## 2020-05-30 RX ADMIN — MORPHINE SULFATE 4 MG: 4 INJECTION INTRAVENOUS at 07:15

## 2020-05-30 RX ADMIN — HYDROCODONE BITARTRATE AND ACETAMINOPHEN 1 TABLET: 5; 325 TABLET ORAL at 11:58

## 2020-05-30 RX ADMIN — DOCUSATE SODIUM - SENNOSIDES 2 TABLET: 50; 8.6 TABLET, FILM COATED ORAL at 18:05

## 2020-05-30 RX ADMIN — FLUCONAZOLE 200 MG: 200 TABLET ORAL at 11:47

## 2020-05-30 RX ADMIN — HYDROCODONE BITARTRATE AND ACETAMINOPHEN 1 TABLET: 5; 325 TABLET ORAL at 20:18

## 2020-05-30 RX ADMIN — OXYCODONE HYDROCHLORIDE 10 MG: 5 TABLET ORAL at 00:04

## 2020-05-30 RX ADMIN — ALPRAZOLAM 0.5 MG: 0.5 TABLET ORAL at 00:04

## 2020-05-30 RX ADMIN — SODIUM CHLORIDE: 9 INJECTION, SOLUTION INTRAVENOUS at 02:27

## 2020-05-30 RX ADMIN — POLYETHYLENE GLYCOL 3350 1 PACKET: 17 POWDER, FOR SOLUTION ORAL at 11:48

## 2020-05-30 RX ADMIN — ONDANSETRON 4 MG: 2 INJECTION INTRAMUSCULAR; INTRAVENOUS at 00:05

## 2020-05-30 RX ADMIN — ALPRAZOLAM 0.5 MG: 0.5 TABLET ORAL at 22:38

## 2020-05-30 RX ADMIN — OXYCODONE HYDROCHLORIDE 10 MG: 5 TABLET ORAL at 09:06

## 2020-05-30 RX ADMIN — MORPHINE SULFATE 4 MG: 4 INJECTION INTRAVENOUS at 01:20

## 2020-05-30 ASSESSMENT — ENCOUNTER SYMPTOMS
NAUSEA: 0
DIZZINESS: 0
SPUTUM PRODUCTION: 0
ABDOMINAL PAIN: 0
DIARRHEA: 0
CONSTIPATION: 1
BRUISES/BLEEDS EASILY: 0
CHILLS: 0
STRIDOR: 0
MYALGIAS: 1
PALPITATIONS: 0
ORTHOPNEA: 0
DIAPHORESIS: 0
FEVER: 0
TINGLING: 0
COUGH: 0
TREMORS: 0

## 2020-05-30 ASSESSMENT — COGNITIVE AND FUNCTIONAL STATUS - GENERAL
MOBILITY SCORE: 24
DAILY ACTIVITIY SCORE: 24
SUGGESTED CMS G CODE MODIFIER DAILY ACTIVITY: CH
SUGGESTED CMS G CODE MODIFIER MOBILITY: CH

## 2020-05-30 NOTE — PROGRESS NOTES
Received report from Will RN.  Patient resting comfortably, denies any needs at this time.  Call light within reach and educated about calling for assistance.

## 2020-05-30 NOTE — CARE PLAN
Problem: Communication  Goal: The ability to communicate needs accurately and effectively will improve  Outcome: PROGRESSING AS EXPECTED  Note: Pt calls appropriately, makes needs known     Problem: Safety  Goal: Will remain free from injury  Outcome: PROGRESSING AS EXPECTED  Goal: Will remain free from falls  Outcome: PROGRESSING AS EXPECTED     Problem: Infection  Goal: Will remain free from infection  Outcome: PROGRESSING AS EXPECTED     Problem: Venous Thromboembolism (VTW)/Deep Vein Thrombosis (DVT) Prevention:  Goal: Patient will participate in Venous Thrombosis (VTE)/Deep Vein Thrombosis (DVT)Prevention Measures  Outcome: PROGRESSING AS EXPECTED  Note: No s/s DVT     Problem: Bowel/Gastric:  Goal: Normal bowel function is maintained or improved  Outcome: PROGRESSING AS EXPECTED  Goal: Will not experience complications related to bowel motility  Outcome: PROGRESSING AS EXPECTED     Problem: Knowledge Deficit  Goal: Knowledge of disease process/condition, treatment plan, diagnostic tests, and medications will improve  Outcome: PROGRESSING AS EXPECTED  Note: Educated on POC, addressed questions  Goal: Knowledge of the prescribed therapeutic regimen will improve  Outcome: PROGRESSING AS EXPECTED     Problem: Discharge Barriers/Planning  Goal: Patient's continuum of care needs will be met  Outcome: PROGRESSING AS EXPECTED     Problem: Fluid Volume:  Goal: Will maintain balanced intake and output  Outcome: PROGRESSING AS EXPECTED     Problem: Pain Management  Goal: Pain level will decrease to patient's comfort goal  Outcome: PROGRESSING AS EXPECTED  Note: Pain managed with PRN pain options     Problem: Mobility  Goal: Risk for activity intolerance will decrease  Outcome: PROGRESSING AS EXPECTED

## 2020-05-30 NOTE — ED NOTES
ERP at bedside. Pt agrees with plan of care discussed by ERP. AIDET acknowledged with patient. Ketamine infusion completed. Pt tolerated well. Sleeping now. Gurney in low position, side rail up for pt safety. Call light within reach. Will continue to monitor.

## 2020-05-30 NOTE — PROGRESS NOTES
Pt reports concerns that pain may be nerve related from fall. Also, pt reports that fluconazole was not taken until this afternoon, well after first ER visit for the severe generalized aches. Pt concerned that this is not related to the generalized aches, as the aching started prior to taking the fluconazole. She does not believe the interaction between fluconazole and atorvastatin is causing this pain. Addressed concerns and notified pt that we will make sure the hospitalist is aware of same.

## 2020-05-30 NOTE — ASSESSMENT & PLAN NOTE
And left radial fracture   from May 9 fall from a ladder, patient confirms she has orthopedic follow up access

## 2020-05-30 NOTE — H&P
Hospital Medicine History & Physical Note    Date of Service  5/29/2020    Primary Care Physician  Jose Saldana M.D.    Code Status  Full Code    Chief Complaint  Chief Complaint   Patient presents with   • Body Aches     pt was seen here this morning for body aches. pt c/o pain not better even after taking norco at home.        History of Presenting Illness  40 y.o. female who presented 5/29/2020 with complains of general body achiness.  Apparently patient came in the morning complains of body achiness started yesterday and getting worse.  Patient was discharged with pain medication however returned to the ER with consistent complaint of pain.  Patient was noticed to have elevated CPK.  Of note patient has been taking high-dose Lipitor 80 mg once a day.  Patient was also given antibiotics however she does not remember the name of antibiotic.  After review of patient's chart, patient was given fluconazole together with Lipitor.  Patient has been taking Lipitor for long time due to familial hyperlipidemia.  Patient's pain is general 6-8/10, intermittent and does not radiate to other location, sharp and with some tingling. Can be controlled by pain meds. Patient otherwise denies fever, chills, nausea, vomiting, adb pain, SOB, CP, headache, constipation, diarrhea, cough, or sputum.    Review of Systems  Review of Systems   Constitutional: Positive for malaise/fatigue. Negative for chills, fever and weight loss.   HENT: Negative for congestion, ear discharge, ear pain, hearing loss and nosebleeds.    Eyes: Negative for blurred vision and pain.   Respiratory: Negative for cough, sputum production, shortness of breath and wheezing.    Cardiovascular: Negative for chest pain, palpitations, leg swelling and PND.   Gastrointestinal: Negative for abdominal pain, constipation, diarrhea, heartburn, nausea and vomiting.   Genitourinary: Negative for dysuria, frequency and hematuria.   Musculoskeletal: Negative for back pain,  falls, joint pain and neck pain.   Skin: Negative for rash.   Neurological: Positive for weakness. Negative for dizziness, tremors, speech change, seizures and headaches.   Psychiatric/Behavioral: Negative for depression, substance abuse and suicidal ideas.       Past Medical History   has a past medical history of Hyperlipidemia.    Surgical History  Denies major surgical history including intra-abdominal or intrathoracic surgery    Family History  Family history reviewed , felt nonpertinent to this encounter.     Social History   reports that she quit smoking about 11 years ago. Her smoking use included cigarettes. She has never used smokeless tobacco. She reports current alcohol use. She reports current drug use. Drug: Oral.    Allergies  No Known Allergies    Medications  Prior to Admission Medications   Prescriptions Last Dose Informant Patient Reported? Taking?   Evolocumab (REPATHA SC) 5/17/2020 Patient Yes No   Sig: Inject 1 Dose as instructed every 14 days.   HYDROcodone-acetaminophen (NORCO) 5-325 MG Tab per tablet 5/28/2020 at PM Patient Yes Yes   Sig: Take 1 Tab by mouth every 6 hours as needed.   alprazolam (XANAX) 0.5 MG Tab 5/28/2020 at HS Patient Yes No   Sig: Take 0.5-1 mg by mouth every evening.   atorvastatin (LIPITOR) 80 MG tablet 5/28/2020 at PM Patient Yes Yes   Sig: Take 80 mg by mouth every evening.   metroNIDAZOLE (FLAGYL) 500 MG Tab 5/29/2020 at 1200 Patient No No   Sig: Take 1 Tab by mouth 3 times a day for 7 days.      Facility-Administered Medications: None       Physical Exam  Temp:  [36.6 °C (97.8 °F)-36.9 °C (98.5 °F)] 36.9 °C (98.5 °F)  Pulse:  [47-75] 63  Resp:  [16-20] 20  BP: (122-154)/(58-96) 154/89  SpO2:  [86 %-99 %] 96 %    Physical Exam  Vitals signs and nursing note reviewed.   Constitutional:       General: She is not in acute distress.     Appearance: Normal appearance. She is normal weight. She is ill-appearing.   HENT:      Head: Normocephalic and atraumatic.      Right  Ear: Tympanic membrane, ear canal and external ear normal.      Left Ear: Tympanic membrane, ear canal and external ear normal.      Nose: Nose normal.      Mouth/Throat:      Mouth: Mucous membranes are moist.      Pharynx: Oropharynx is clear.   Eyes:      Extraocular Movements: Extraocular movements intact.      Conjunctiva/sclera: Conjunctivae normal.      Pupils: Pupils are equal, round, and reactive to light.   Neck:      Musculoskeletal: Normal range of motion and neck supple. No neck rigidity.      Vascular: No carotid bruit.   Cardiovascular:      Rate and Rhythm: Normal rate and regular rhythm.      Pulses: Normal pulses.      Heart sounds: Normal heart sounds. No murmur. No friction rub. No gallop.    Pulmonary:      Effort: Pulmonary effort is normal. No respiratory distress.      Breath sounds: Normal breath sounds. No stridor. No wheezing, rhonchi or rales.   Chest:      Chest wall: No tenderness.   Abdominal:      General: Abdomen is flat. Bowel sounds are normal. There is no distension.      Palpations: Abdomen is soft. There is no mass.      Tenderness: There is no abdominal tenderness. There is no guarding or rebound.      Hernia: No hernia is present.   Musculoskeletal: Normal range of motion.         General: No swelling.   Lymphadenopathy:      Cervical: No cervical adenopathy.   Skin:     General: Skin is warm.      Capillary Refill: Capillary refill takes more than 3 seconds.      Coloration: Skin is not jaundiced or pale.   Neurological:      General: No focal deficit present.      Mental Status: She is alert and oriented to person, place, and time. Mental status is at baseline.   Psychiatric:         Mood and Affect: Mood normal.         Behavior: Behavior normal.         Laboratory:  Recent Labs     05/29/20  0650 05/29/20  1557   WBC 6.7 8.6   RBC 4.90 4.85   HEMOGLOBIN 15.5 15.3   HEMATOCRIT 45.6 45.1   MCV 93.1 93.0   MCH 31.6 31.5   MCHC 34.0 33.9   RDW 42.1 42.3   PLATELETCT 309 295    MPV 10.1 9.8     Recent Labs     05/29/20  0650 05/29/20  1557   SODIUM 138 139   POTASSIUM 4.2 3.7   CHLORIDE 102 105   CO2 21 20   GLUCOSE 125* 137*   BUN 16 11   CREATININE 0.68 0.69   CALCIUM 9.4 9.8     Recent Labs     05/29/20  0650 05/29/20  1557   ALTSGPT  --  48   ASTSGOT  --  30   ALKPHOSPHAT  --  97   TBILIRUBIN  --  0.4   GLUCOSE 125* 137*         No results for input(s): NTPROBNP in the last 72 hours.      No results for input(s): TROPONINT in the last 72 hours.    Imaging:  No orders to display         Assessment/Plan:      * Statin-induced myositis  Assessment & Plan  Patient complains of general muscle pain..  Patient has been on high-dose Lipitor for familial hyperlipidemia  Patient was recently given antibiotics however patient does not remember specifically which one it is.  After review patient case noticed patient was given fluconazole.  Possible interaction with Lipitor and caused statin induced myositis.  IV fluid rehydration  CPK elevated on admission but trending down  Trend CPK tomorrow  Pain control  Monitor kidney function  Temporarily discontinue Lipitor.    Familial hyperlipidemia  Assessment & Plan  Patient has been on high-dose Lipitor  Patient is also on evolocumab q 2w  Due to possible myositis temporarily hold Lipitor.    Hyperglycemia  Assessment & Plan  Possibly stress-induced  No history of diabetes    Bradycardia  Assessment & Plan  Patient is asymptomatic  Continue to monitor    Intractable pain  Assessment & Plan  Patient complains of general muscle pain..  Patient has been on high-dose Lipitor for familial hyperlipidemia  Patient was recently given antibiotics however patient does not remember specifically which one it is.  After review patient case noticed patient was given fluconazole.  Possible interaction with Lipitor and caused statin induced myositis.  IV fluid rehydration  CPK elevated on admission but trending down  Trend CPK tomorrow  Pain control  Monitor kidney  function  Temporarily discontinue Lipitor.    DVT prophylaxis SCD and Lovenox  Anticipated hospital stay less than 2 midnights patient is appropriate for observation status.

## 2020-05-30 NOTE — PROGRESS NOTES
Pt arrived to room on [facility] via gurney, ambulated to bed with minimal difficulty noted. Pt awake, alert, appropriate and pleasant. Pt reports pain managed at this time but increasing, rates 5/10 at this time generalized to whole body. Pt denies nausea. Denies respiratory symptoms. Reports severe chills as well as an intermittent burning sensation to skin. VSS. /89   Pulse 63   Temp 36.9 °C (98.5 °F) (Oral)   Resp 18   SpO2 96%  oriented to room. Concerns addressed. Will page hospitalist with updates regarding symptoms. In the meantime, will limit exposure to patient. Pt aware of plan. Call light in reach.

## 2020-05-30 NOTE — PROGRESS NOTES
2 RN skin assessment complete, wound to medial aspect of L ankle, 2 areas to partial thickness wound. Wound is scabbed, dry, with reddened skin surrounding. Open to air. Hard area noted surrounding the upper, more medial of the 2 open areas on the L ankle. Pt reports recent abx for infection to this site. Reports the wound is from falling off of a ladder from 18 ft May 9, 2020. Has cast to L forearm, denies any rubbing or pain to this area. Skin to area surrounding cast CDI. Large area of bruising to lateral aspect of L hip and upper leg, pt reports large hematoma in this area since May 9, 2020 fall. Reports it is improving. Slight abrasion to R shin.

## 2020-05-30 NOTE — PROGRESS NOTES
"[facility] Medicine Daily Progress Note    Date of Service  5/30/2020    Chief Complaint  40 y.o. female admitted 5/29/2020 with muscle aches, chills, and night sweats    Hospital Course    This is a 40 y.o. female who presented 5/29/2020 with complains of general body achiness.  Apparently patient came in the morning complains of body achiness started 5/28 and getting worse.  Patient was discharged with pain medication however returned to the ER with consistent pain.  Patient was noticed to have elevated CPK.  Of note patient has been taking high-dose Lipitor 80 mg once a day for familial hypercholesterolemia.        Interval Problem Update  The patient has much improved pain control and her other symptoms resolved with iv pain medication    Consultants/Specialty  none    Code Status  full    Disposition  home    Review of Systems  Review of Systems   Constitutional: Negative for chills, diaphoresis, fever and malaise/fatigue.        Night sweats prior to admission   HENT: Negative for congestion.    Respiratory: Negative for cough, sputum production and stridor.    Cardiovascular: Negative for chest pain, palpitations and orthopnea.   Gastrointestinal: Positive for constipation. Negative for abdominal pain, diarrhea and nausea.        No stool for several days   Genitourinary: Negative for dysuria, frequency and urgency.   Musculoskeletal: Positive for myalgias.        Improved on iv pain meds   Skin: Negative for itching and rash.   Neurological: Negative for dizziness, tingling and tremors.        Skin \"hurt\" the day prior to admission   Endo/Heme/Allergies: Does not bruise/bleed easily.        Physical Exam  Temp:  [36.6 °C (97.8 °F)-37.2 °C (99 °F)] 37.2 °C (99 °F)  Pulse:  [47-75] 52  Resp:  [16-20] 20  BP: (122-154)/(58-96) 150/88  SpO2:  [86 %-99 %] 99 %    Physical Exam  Vitals signs and nursing note reviewed.   Constitutional:       Appearance: She is not ill-appearing or diaphoretic.   HENT:      Nose: " Nose normal.      Mouth/Throat:      Mouth: Mucous membranes are moist.      Pharynx: Oropharynx is clear.   Eyes:      General: No scleral icterus.     Conjunctiva/sclera: Conjunctivae normal.      Pupils: Pupils are equal, round, and reactive to light.   Neck:      Musculoskeletal: No neck rigidity or muscular tenderness.   Cardiovascular:      Rate and Rhythm: Normal rate and regular rhythm.      Heart sounds: Normal heart sounds. No friction rub.   Pulmonary:      Effort: No respiratory distress.      Breath sounds: Normal breath sounds.   Abdominal:      General: Bowel sounds are normal. There is no distension.      Tenderness: There is no abdominal tenderness.   Musculoskeletal:         General: Signs of injury present. No swelling.      Comments: Cast over left arm and skin healing over left ankle with some scar formation medially   Skin:     General: Skin is warm and dry.      Capillary Refill: Capillary refill takes less than 2 seconds.      Coloration: Skin is not pale.      Findings: No erythema or rash.   Neurological:      General: No focal deficit present.      Mental Status: She is alert and oriented to person, place, and time.      Coordination: Coordination normal.   Psychiatric:         Mood and Affect: Mood normal.         Behavior: Behavior normal.         Fluids  No intake or output data in the 24 hours ending 05/30/20 1138    Laboratory  Recent Labs     05/29/20  0650 05/29/20  1557 05/30/20  0439   WBC 6.7 8.6 7.9   RBC 4.90 4.85 5.08   HEMOGLOBIN 15.5 15.3 16.2*   HEMATOCRIT 45.6 45.1 50.1*   MCV 93.1 93.0 98.6*   MCH 31.6 31.5 31.9   MCHC 34.0 33.9 32.3*   RDW 42.1 42.3 45.1   PLATELETCT 309 295 194   MPV 10.1 9.8 11.4     Recent Labs     05/29/20  0650 05/29/20  1557 05/30/20  0439   SODIUM 138 139 138   POTASSIUM 4.2 3.7 4.5   CHLORIDE 102 105 109   CO2 21 20 17*   GLUCOSE 125* 137* 117*   BUN 16 11 8   CREATININE 0.68 0.69 0.69   CALCIUM 9.4 9.8 9.5                   Imaging  US-EXTREMITY  VENOUS LOWER BILAT    (Results Pending)        Assessment/Plan  * Statin-induced myositis  Assessment & Plan  Vs induced by immobility as the patient was only laying in bed for two days off her narcotic medication  Labs are improving, will resume statin on discharge    Familial hyperlipidemia  Assessment & Plan  Patient has been on high-dose Lipitor  Patient is also on evolocumab q 2w  Possibly contributing to myositis with elevated cpk temporarily hold Lipitor.    Hyperglycemia  Assessment & Plan  Stress response    Bradycardia  Assessment & Plan  Mild, patient is asymptomatic    Intractable pain  Assessment & Plan  Concerning for narcotic withdrawal as the patient has been on norco 2-4 times a day for three weeks since she fell off a ladder and stopped taking any pain medication two days prior to admission  This coincides with her symptoms and symptoms improved/resolved with iv pain meds  Will check blood cultures to ensure no bacteremia given her skin tear with her recent trauma  Will check duplex of legs to ensure no dvt adding pain due to her trauma  Taper narcotics and will monitor tolerance     left radius fracture and left fibula fracture from May 9 fall from a ladder, patient confirms she has orthopedic follow up access    VTE prophylaxis: lovenox

## 2020-05-30 NOTE — CARE PLAN
Problem: Communication  Goal: The ability to communicate needs accurately and effectively will improve  Outcome: PROGRESSING AS EXPECTED  Intervention: [place] patient and significant other/support system to call light to alert staff of needs  Flowsheets (Taken 5/30/2020 5505)  Oriented to:: All of the Following : Location of Bathroom, Visiting Policy, Unit Routine, Call Light and Bedside Controls, Bedside Rail Policy, Smoking Policy, Rights and Responsibilities, Bedside Report, and Patient Education Notebook  Note: Pt reoriented to unit during AM rounds. Pt instructed to utilize call light to call for assistance as needed. Pt verbalized understanding and calls appropriately. Hourly rounding in place to ensure needs are met.       Problem: Pain Management  Goal: Pain level will decrease to patient's comfort goal  Outcome: PROGRESSING AS EXPECTED  Intervention: Follow pain managment plan developed in collaboration with patient and Interdisciplinary Team  Note: Prn pain medication regimen established by MD. Pt verbalizes pain relief with administration of prn Weinert. Pt calls appropriately to request pain medication.

## 2020-05-30 NOTE — ASSESSMENT & PLAN NOTE
Patient has been on high-dose Lipitor  Patient is also on evolocumab q 2w  Possibly contributing to myositis with elevated cpk temporarily hold Lipitor.

## 2020-05-30 NOTE — ASSESSMENT & PLAN NOTE
Concerning for narcotic withdrawal as the patient has been on norco 2-4 times a day for three weeks since she fell off a ladder and stopped taking any pain medication two days prior to admission  This coincides with her symptoms and symptoms improved/resolved with iv pain meds  Will check blood cultures to ensure no bacteremia given her skin tear with her recent trauma  Will check duplex of legs to ensure no dvt adding pain due to her trauma  Taper narcotics and will monitor tolerance

## 2020-05-30 NOTE — PROGRESS NOTES
Bedside report received from night RN. Assumed care of patient. Daily plan of care discussed. Pt awake and alert, anxious. Pt reports constant generalized pain at this time but currently declines pain medication. Hourly rounding in place.

## 2020-05-30 NOTE — PROGRESS NOTES
No further updates from hospitalist. Continuing to limit exposure to pt room to only necessary staff. Call light in reach. Will continue to monitor.

## 2020-05-30 NOTE — ASSESSMENT & PLAN NOTE
Vs induced by immobility as the patient was only laying in bed for two days off her narcotic medication  Labs are improving, will resume statin on discharge

## 2020-05-31 VITALS
BODY MASS INDEX: 24.91 KG/M2 | TEMPERATURE: 98 F | HEIGHT: 67 IN | WEIGHT: 158.73 LBS | DIASTOLIC BLOOD PRESSURE: 78 MMHG | HEART RATE: 70 BPM | OXYGEN SATURATION: 92 % | SYSTOLIC BLOOD PRESSURE: 118 MMHG | RESPIRATION RATE: 18 BRPM

## 2020-05-31 PROBLEM — M60.9 STATIN-INDUCED MYOSITIS: Status: RESOLVED | Noted: 2020-05-29 | Resolved: 2020-05-31

## 2020-05-31 PROBLEM — F11.93 ACUTE NARCOTIC WITHDRAWAL (HCC): Status: ACTIVE | Noted: 2020-05-31

## 2020-05-31 PROBLEM — R73.9 HYPERGLYCEMIA: Status: RESOLVED | Noted: 2020-05-29 | Resolved: 2020-05-31

## 2020-05-31 PROBLEM — T46.6X5A STATIN-INDUCED MYOSITIS: Status: RESOLVED | Noted: 2020-05-29 | Resolved: 2020-05-31

## 2020-05-31 PROBLEM — R52 INTRACTABLE PAIN: Status: RESOLVED | Noted: 2020-05-29 | Resolved: 2020-05-31

## 2020-05-31 PROBLEM — F11.93 ACUTE NARCOTIC WITHDRAWAL (HCC): Status: RESOLVED | Noted: 2020-05-31 | Resolved: 2020-05-31

## 2020-05-31 PROCEDURE — 96372 THER/PROPH/DIAG INJ SC/IM: CPT

## 2020-05-31 PROCEDURE — 87040 BLOOD CULTURE FOR BACTERIA: CPT

## 2020-05-31 PROCEDURE — 99217 PR OBSERVATION CARE DISCHARGE: CPT | Performed by: INTERNAL MEDICINE

## 2020-05-31 PROCEDURE — 36415 COLL VENOUS BLD VENIPUNCTURE: CPT

## 2020-05-31 PROCEDURE — 700102 HCHG RX REV CODE 250 W/ 637 OVERRIDE(OP): Performed by: INTERNAL MEDICINE

## 2020-05-31 PROCEDURE — 700111 HCHG RX REV CODE 636 W/ 250 OVERRIDE (IP): Performed by: INTERNAL MEDICINE

## 2020-05-31 PROCEDURE — A9270 NON-COVERED ITEM OR SERVICE: HCPCS | Performed by: INTERNAL MEDICINE

## 2020-05-31 PROCEDURE — G0378 HOSPITAL OBSERVATION PER HR: HCPCS

## 2020-05-31 RX ORDER — FLUCONAZOLE 200 MG/1
200 TABLET ORAL DAILY
Qty: 3 TAB | Refills: 0 | Status: SHIPPED | OUTPATIENT
Start: 2020-06-01 | End: 2021-01-06

## 2020-05-31 RX ORDER — DOCUSATE SODIUM 100 MG/1
100 CAPSULE, LIQUID FILLED ORAL 2 TIMES DAILY
Qty: 20 CAP | Refills: 0 | Status: SHIPPED | OUTPATIENT
Start: 2020-05-31 | End: 2020-06-10

## 2020-05-31 RX ORDER — HYDROCODONE BITARTRATE AND ACETAMINOPHEN 5; 325 MG/1; MG/1
TABLET ORAL
Qty: 5 TAB | Refills: 0
Start: 2020-05-31 | End: 2020-06-05

## 2020-05-31 RX ADMIN — HYDROCODONE BITARTRATE AND ACETAMINOPHEN 1 TABLET: 5; 325 TABLET ORAL at 05:33

## 2020-05-31 RX ADMIN — POLYETHYLENE GLYCOL 3350 1 PACKET: 17 POWDER, FOR SOLUTION ORAL at 05:33

## 2020-05-31 RX ADMIN — ENOXAPARIN SODIUM 40 MG: 40 INJECTION SUBCUTANEOUS at 05:35

## 2020-05-31 RX ADMIN — FLUCONAZOLE 200 MG: 200 TABLET ORAL at 05:33

## 2020-05-31 RX ADMIN — DOCUSATE SODIUM - SENNOSIDES 2 TABLET: 50; 8.6 TABLET, FILM COATED ORAL at 05:33

## 2020-05-31 NOTE — PROGRESS NOTES
Report received from night shift RN. Assume care. Pt. AAOx4 pt is in bed,  Assessment completed. VSS. Sling in place to LUE, no numbness and tingling reported.  Pt was update for the care for the day. White board updated, All question answered. Pt has call light within reach,  bed is in the lowest position. Pt has no other needs at this time.   [id number] Dr Gaytan at bedside Pt to go home.

## 2020-05-31 NOTE — DISCHARGE SUMMARY
"Discharge Summary    CHIEF COMPLAINT ON ADMISSION  Chief Complaint   Patient presents with   • Body Aches     pt was seen here this morning for body aches. pt c/o pain not better even after taking norco at home.        Reason for Admission  Bodyaches     Admission Date  5/29/2020    CODE STATUS  Full Code    HPI & HOSPITAL COURSE  This is a 40 y.o. female who presented 5/29/2020 with complains of general body achiness.  Apparently patient came in the morning complains of body achiness started 5/28 and getting worse.  Patient was discharged with pain medication however returned to the ER with consistent pain.  Patient was noticed to have elevated CPK.  Of note patient has been taking high-dose Lipitor 80 mg once a day for familial hypercholesterolemia.   She had been on a narcotic 2 to 4 times daily for over two weeks for her fractures from a fall off a ladder on May 9 and stopped the pain medication completely for two days and this coincided with her symptoms. She had also been laying in bed for two days due to body aches, sweats and \"skin\" hurting. She had a lower extremity duplex scan showing no thrombosis and blood cultures showed no bacteria. Her symptoms resolved on iv narcotic medication and she was educated about narcotic withdrawal as she had been on pain medication for two weeks and stopped suddenly. She was educated about tapering off her pain medication. She was also constipated with no bowel movement for five days but did pass stool after miralax. She is recommended to take docusate while taking narcotics. Her CPK improved with fluids and movement. It is likely her immobility from narcotic withdrawal symptoms contributed more to her elevated cpk than her statin use but she will need follow up for this and her cpk monitored on statin therapy.    Therefore, she is discharged in good and stable condition to home with close outpatient follow-up.    The patient recovered much more quickly than anticipated on " admission.    Discharge Date  5/31/20    FOLLOW UP ITEMS POST DISCHARGE  Primary care provider for cpk and lipid level check  Taper off narcotics as recommended  Follow up with orthopedic surgery in one week for fracture assessment    DISCHARGE DIAGNOSES  Principal Problem (Resolved):    Statin-induced myositis POA: Yes  Active Problems:    Bradycardia POA: Yes    Familial hyperlipidemia POA: Yes    Closed fracture of distal end of left fibula POA: Yes  Resolved Problems:    Intractable pain POA: Yes    Hyperglycemia POA: Yes    Acute narcotic withdrawal (HCC) POA: Yes      FOLLOW UP  No future appointments.  Adrien Melara M.D.  [address]  [id number]  [facility] [address]  [phone]      As needed    orthopedic surgery    In 1 week  for fracture healing assessment      MEDICATIONS ON DISCHARGE     Medication List      START taking these medications      Instructions   docusate sodium 100 MG Caps  Commonly known as:  COLACE   Doctor's comments:  For constipation  Take 1 Cap by mouth 2 times a day for 10 days.  Dose:  100 mg     fluconazole 200 MG Tabs  Start taking on:  June 1, 2020  Commonly known as:  DIFLUCAN   Take 1 Tab by mouth every day.  Dose:  200 mg        CHANGE how you take these medications      Instructions   HYDROcodone-acetaminophen 5-325 MG Tabs per tablet  What changed:    · how much to take  · how to take this  · when to take this  · reasons to take this  · additional instructions  Commonly known as:  NORCO   Take one tablet q12h on day1, take 1 tablet daily on day 2, 3, take 1/2 tab daily on day 4,5 then stop.        CONTINUE taking these medications      Instructions   ALPRAZolam 0.5 MG Tabs  Commonly known as:  XANAX   Take 0.5-1 mg by mouth every evening.  Dose:  0.5-1 mg     atorvastatin 80 MG tablet  Commonly known as:  LIPITOR   Take 80 mg by mouth every evening.  Dose:  80 mg     metroNIDAZOLE 500 MG Tabs  Commonly known as:  Flagyl   Take 1 Tab by mouth 3 times a day for 7  days.  Dose:  500 mg     REPATHA SC   Inject 1 Dose as instructed every 14 days.  Dose:  1 Dose            Allergies  No Known Allergies    DIET  Orders Placed This Encounter   Procedures   • Diet Order Regular     Standing Status:   Standing     Number of Occurrences:   1     Order Specific Question:   Diet:     Answer:   Regular [1]       ACTIVITY  As tolerated.  Weight bearing as tolerated    CONSULTATIONS  none    PROCEDURES  none    LABORATORY  Lab Results   Component Value Date    SODIUM 138 05/30/2020    POTASSIUM 4.5 05/30/2020    CHLORIDE 109 05/30/2020    CO2 17 (L) 05/30/2020    GLUCOSE 117 (H) 05/30/2020    BUN 8 05/30/2020    CREATININE 0.69 05/30/2020    CREATININE 0.8 12/02/2005        Lab Results   Component Value Date    WBC 7.9 05/30/2020    HEMOGLOBIN 16.2 (H) 05/30/2020    HEMATOCRIT 50.1 (H) 05/30/2020    PLATELETCT 194 05/30/2020        Total time of the discharge process exceeds 32 minutes.

## 2020-05-31 NOTE — PROGRESS NOTES
Pt AAOx4. Reports 4/10 generalized pain. Denies any SOB, nausea, vomiting, dizziness, chills. Pt ambulating in the hallway and back. Reports passing gas, but no BM yet. LAst BM 5/27.  Safety and comfort measures in place. No additional needs at this time.

## 2020-05-31 NOTE — PROGRESS NOTES
Pt discharged home in good and stable condition, all belongings taken, voided prior DC,  All questions answered, and DC education given. Escorted to main exit by Sarai

## 2020-05-31 NOTE — CARE PLAN
Problem: Pain Management  Goal: Pain level will decrease to patient's comfort goal  Outcome: PROGRESSING AS EXPECTED   Patient's pain is well-controlled with Norco. Encouraged the use of non-pharm measures of pain relief (i.e. heat packs) and to inform RN if pain is greater than comfort level.        Problem: Mobility  Goal: Risk for activity intolerance will decrease  Outcome: PROGRESSING AS EXPECTED   Pt ambulated in the hallway up to nursing station and back w/o sign of distress or discomfort.       Problem: Communication  Goal: The ability to communicate needs accurately and effectively will improve  Outcome: PROGRESSING AS EXPECTED   Pt able to communicate needs appropriately to staff. Plan of care discussed to pt. Questions and concerns answered. Pt. Verbalized understanding. Communication board updated.

## 2020-05-31 NOTE — DISCHARGE INSTRUCTIONS
Fluconazole tablets  What is this medicine?  FLUCONAZOLE (floo LAVON na zole) is an antifungal medicine. It is used to treat certain kinds of fungal or yeast infections.  This medicine may be used for other purposes; ask your health care provider or pharmacist if you have questions.  COMMON BRAND NAME(S): Diflucan  What should I tell my health care provider before I take this medicine?  They need to know if you have any of these conditions:  -electrolyte abnormalities  -history of irregular heart beat  -kidney disease  -an unusual or allergic reaction to fluconazole, other azole antifungals, medicines, foods, dyes, or preservatives  -pregnant or trying to get pregnant  -breast-feeding  How should I use this medicine?  Take this medicine by mouth. Follow the directions on the prescription label. Do not take your medicine more often than directed.  Talk to your pediatrician regarding the use of this medicine in children. Special care may be needed. This medicine has been used in children as young as 6 months of age.  Overdosage: If you think you have taken too much of this medicine contact a poison control center or emergency room at once.  NOTE: This medicine is only for you. Do not share this medicine with others.  What if I miss a dose?  If you miss a dose, take it as soon as you can. If it is almost time for your next dose, take only that dose. Do not take double or extra doses.  What may interact with this medicine?  Do not take this medicine with any of the following medications:  -astemizole  -certain medicines for irregular heart beat like dofetilide, dronedarone, quinidine  -cisapride  -erythromycin  -lomitapide  -other medicines that prolong the QT interval (cause an abnormal heart rhythm)  -pimozide  -terfenadine  -thioridazine  -tolvaptan  -ziprasidone  This medicine may also interact with the following medications:  -antiviral medicines for HIV or AIDS  -birth control pills  -certain antibiotics like  rifabutin, rifampin  -certain medicines for blood pressure like amlodipine, isradipine, felodipine, hydrochlorothiazide, losartan, nifedipine  -certain medicines for cancer like cyclophosphamide, vinblastine, vincristine  -certain medicines for cholesterol like atorvastatin, lovastatin, fluvastatin, simvastatin  -certain medicines for depression, anxiety, or psychotic disturbances like amitriptyline, midazolam, nortriptyline, triazolam  -certain medicines for diabetes like glipizide, glyburide, tolbutamide  -certain medicines for pain like alfentanil, fentanyl, methadone  -certain medicines for seizures like carbamazepine, phenytoin  -certain medicines that treat or prevent blood clots like warfarin  -halofantrine  -medicines that lower your chance of fighting infection like cyclosporine, prednisone, tacrolimus  -NSAIDS, medicines for pain and inflammation, like celecoxib, diclofenac, flurbiprofen, ibuprofen, meloxicam, naproxen  -other medicines for fungal infections  -sirolimus  -theophylline  -tofacitinib  This list may not describe all possible interactions. Give your health care provider a list of all the medicines, herbs, non-prescription drugs, or dietary supplements you use. Also tell them if you smoke, drink alcohol, or use illegal drugs. Some items may interact with your medicine.  What should I watch for while using this medicine?  Visit your doctor or health care professional for regular checkups. If you are taking this medicine for a long time you may need blood work. Tell your doctor if your symptoms do not improve. Some fungal infections need many weeks or months of treatment to cure.  Alcohol can increase possible damage to your liver. Avoid alcoholic drinks.  If you have a vaginal infection, do not have sex until you have finished your treatment. You can wear a sanitary napkin. Do not use tampons. Wear freshly washed cotton, not synthetic, panties.  What side effects may I notice from receiving this  medicine?  Side effects that you should report to your doctor or health care professional as soon as possible:  -allergic reactions like skin rash or itching, hives, swelling of the lips, mouth, tongue, or throat  -dark urine  -feeling dizzy or faint  -irregular heartbeat or chest pain  -redness, blistering, peeling or loosening of the skin, including inside the mouth  -trouble breathing  -unusual bruising or bleeding  -vomiting  -yellowing of the eyes or skin  Side effects that usually do not require medical attention (report to your doctor or health care professional if they continue or are bothersome):  -changes in how food tastes  -diarrhea  -headache  -stomach upset or nausea  This list may not describe all possible side effects. Call your doctor for medical advice about side effects. You may report side effects to FDA at 2-172-FDA-9039.  Where should I keep my medicine?  Keep out of the reach of children.  Store at room temperature below 30 degrees C (86 degrees F). Throw away any medicine after the expiration date.  NOTE: This sheet is a summary. It may not cover all possible information. If you have questions about this medicine, talk to your doctor, pharmacist, or health care provider.  © 2018 [employer] (2014-07-26 16:13:04)  Discharge Instructions    Discharged to home by car with relative. Discharged via walking, hospital escort: Yes.  Special equipment needed: Not Applicable    Be sure to schedule a follow-up appointment with your primary care doctor or any specialists as instructed.     Discharge Plan:   Diet Plan: Discussed  Activity Level: Discussed  Smoking Cessation Offered: Patient Refused  Confirmed Follow up Appointment: Patient to Call and Schedule Appointment  Confirmed Symptoms Management: Discussed  Medication Reconciliation Updated: Yes    I understand that a diet low in cholesterol, fat, and sodium is recommended for good health. Unless I have been given specific instructions  below for another diet, I accept this instruction as my diet prescription.   Other diet: Regular    Special Instructions: None    · Is patient discharged on Warfarin / Coumadin?   No     Depression / Suicide Risk    As you are discharged from this [facility] facility, it is important to learn how to keep safe from harming yourself.    Recognize the warning signs:  · Abrupt changes in personality, positive or negative- including increase in energy   · Giving away possessions  · Change in eating patterns- significant weight changes-  positive or negative  · Change in sleeping patterns- unable to sleep or sleeping all the time   · Unwillingness or inability to communicate  · Depression  · Unusual sadness, discouragement and loneliness  · Talk of wanting to die  · Neglect of personal appearance   · Rebelliousness- reckless behavior  · Withdrawal from people/activities they love  · Confusion- inability to concentrate     If you or a loved one observes any of these behaviors or has concerns about self-harm, here's what you can do:  · Talk about it- your feelings and reasons for harming yourself  · Remove any means that you might use to hurt yourself (examples: pills, rope, extension cords, firearm)  · Get professional help from the community (Mental Health, Substance Abuse, psychological counseling)  · Do not be alone:Call your Safe Contact- someone whom you trust who will be there for you.  · Call your local CRISIS HOTLINE 659-0036 or 429-880-7667  · Call your local Children's Mobile Crisis Response Team [place] (883) 966-2917 or www.[employer]  · Call the toll free National Suicide Prevention Hotlines   · National Suicide Prevention Lifeline 989-305-SZUO (9783)  · National Hope Line Network 800-SUICIDE (444-2014)      Discharge Instructions per Rosamaria Gaytan M.D.    Follow up with orthopedic surgery in one week for healing follow up    DIET: regular    ACTIVITY: as tolerated    DIAGNOSIS:acute narcotic  withdrawal    Return to ER if symptoms worsen

## 2020-05-31 NOTE — CARE PLAN
Problem: Communication  Goal: The ability to communicate needs accurately and effectively will improve  Outcome: MET     Problem: Safety  Goal: Will remain free from injury  Outcome: MET  Goal: Will remain free from falls  Outcome: MET     Problem: Infection  Goal: Will remain free from infection  Outcome: MET     Problem: Venous Thromboembolism (VTW)/Deep Vein Thrombosis (DVT) Prevention:  Goal: Patient will participate in Venous Thrombosis (VTE)/Deep Vein Thrombosis (DVT)Prevention Measures  Outcome: MET     Problem: Knowledge Deficit  Goal: Knowledge of disease process/condition, treatment plan, diagnostic tests, and medications will improve  Outcome: MET  Goal: Knowledge of the prescribed therapeutic regimen will improve  Outcome: MET     Problem: Discharge Barriers/Planning  Goal: Patient's continuum of care needs will be met  Outcome: MET     Problem: Bowel/Gastric:  Goal: Will not experience complications related to bowel motility  Outcome: PROGRESSING AS EXPECTED

## 2020-06-05 LAB
BACTERIA BLD CULT: NORMAL
BACTERIA BLD CULT: NORMAL
SIGNIFICANT IND 70042: NORMAL
SIGNIFICANT IND 70042: NORMAL
SITE SITE: NORMAL
SITE SITE: NORMAL
SOURCE SOURCE: NORMAL
SOURCE SOURCE: NORMAL

## 2020-06-11 ENCOUNTER — HOSPITAL ENCOUNTER (OUTPATIENT)
Dept: LAB | Facility: MEDICAL CENTER | Age: 40
End: 2020-06-11
Attending: FAMILY MEDICINE
Payer: COMMERCIAL

## 2020-06-11 LAB
ALBUMIN SERPL BCP-MCNC: 4.7 G/DL (ref 3.2–4.9)
ALBUMIN/GLOB SERPL: 2 G/DL
ALP SERPL-CCNC: 73 U/L (ref 30–99)
ALT SERPL-CCNC: 57 U/L (ref 2–50)
ANION GAP SERPL CALC-SCNC: 14 MMOL/L (ref 7–16)
AST SERPL-CCNC: 30 U/L (ref 12–45)
BASOPHILS # BLD AUTO: 1.2 % (ref 0–1.8)
BASOPHILS # BLD: 0.05 K/UL (ref 0–0.12)
BILIRUB SERPL-MCNC: 0.5 MG/DL (ref 0.1–1.5)
BUN SERPL-MCNC: 14 MG/DL (ref 8–22)
CALCIUM SERPL-MCNC: 9.7 MG/DL (ref 8.5–10.5)
CHLORIDE SERPL-SCNC: 102 MMOL/L (ref 96–112)
CHOLEST SERPL-MCNC: 223 MG/DL (ref 100–199)
CO2 SERPL-SCNC: 23 MMOL/L (ref 20–33)
CREAT SERPL-MCNC: 0.73 MG/DL (ref 0.5–1.4)
CRP SERPL HS-MCNC: 0.7 MG/L (ref 0–7.5)
EOSINOPHIL # BLD AUTO: 0.17 K/UL (ref 0–0.51)
EOSINOPHIL NFR BLD: 4.1 % (ref 0–6.9)
ERYTHROCYTE [DISTWIDTH] IN BLOOD BY AUTOMATED COUNT: 43.3 FL (ref 35.9–50)
ERYTHROCYTE [SEDIMENTATION RATE] IN BLOOD BY WESTERGREN METHOD: 1 MM/HOUR (ref 0–20)
FASTING STATUS PATIENT QL REPORTED: NORMAL
GLOBULIN SER CALC-MCNC: 2.4 G/DL (ref 1.9–3.5)
GLUCOSE SERPL-MCNC: 74 MG/DL (ref 65–99)
HCT VFR BLD AUTO: 46.1 % (ref 37–47)
HDLC SERPL-MCNC: 45 MG/DL
HGB BLD-MCNC: 15 G/DL (ref 12–16)
IMM GRANULOCYTES # BLD AUTO: 0.01 K/UL (ref 0–0.11)
IMM GRANULOCYTES NFR BLD AUTO: 0.2 % (ref 0–0.9)
LDLC SERPL CALC-MCNC: 131 MG/DL
LYMPHOCYTES # BLD AUTO: 1.54 K/UL (ref 1–4.8)
LYMPHOCYTES NFR BLD: 37.5 % (ref 22–41)
MCH RBC QN AUTO: 32.1 PG (ref 27–33)
MCHC RBC AUTO-ENTMCNC: 32.5 G/DL (ref 33.6–35)
MCV RBC AUTO: 98.5 FL (ref 81.4–97.8)
MONOCYTES # BLD AUTO: 0.29 K/UL (ref 0–0.85)
MONOCYTES NFR BLD AUTO: 7.1 % (ref 0–13.4)
NEUTROPHILS # BLD AUTO: 2.05 K/UL (ref 2–7.15)
NEUTROPHILS NFR BLD: 49.9 % (ref 44–72)
NRBC # BLD AUTO: 0 K/UL
NRBC BLD-RTO: 0 /100 WBC
PLATELET # BLD AUTO: 288 K/UL (ref 164–446)
PMV BLD AUTO: 10.2 FL (ref 9–12.9)
POTASSIUM SERPL-SCNC: 3.7 MMOL/L (ref 3.6–5.5)
PROT SERPL-MCNC: 7.1 G/DL (ref 6–8.2)
RBC # BLD AUTO: 4.68 M/UL (ref 4.2–5.4)
SODIUM SERPL-SCNC: 139 MMOL/L (ref 135–145)
T4 SERPL-MCNC: 5.6 UG/DL (ref 4–12)
TRIGL SERPL-MCNC: 234 MG/DL (ref 0–149)
TSH SERPL DL<=0.005 MIU/L-ACNC: 5.65 UIU/ML (ref 0.38–5.33)
WBC # BLD AUTO: 4.1 K/UL (ref 4.8–10.8)

## 2020-06-11 PROCEDURE — 80053 COMPREHEN METABOLIC PANEL: CPT

## 2020-06-11 PROCEDURE — 36415 COLL VENOUS BLD VENIPUNCTURE: CPT

## 2020-06-11 PROCEDURE — 85025 COMPLETE CBC W/AUTO DIFF WBC: CPT

## 2020-06-11 PROCEDURE — 85652 RBC SED RATE AUTOMATED: CPT

## 2020-06-11 PROCEDURE — 80061 LIPID PANEL: CPT

## 2020-06-11 PROCEDURE — 86141 C-REACTIVE PROTEIN HS: CPT

## 2020-06-11 PROCEDURE — 84436 ASSAY OF TOTAL THYROXINE: CPT

## 2020-06-11 PROCEDURE — 84443 ASSAY THYROID STIM HORMONE: CPT

## 2020-10-27 ENCOUNTER — OFFICE VISIT (OUTPATIENT)
Dept: DERMATOLOGY | Facility: IMAGING CENTER | Age: 40
End: 2020-10-27
Payer: COMMERCIAL

## 2020-10-27 VITALS — WEIGHT: 154 LBS | TEMPERATURE: 98.2 F | HEIGHT: 67 IN | BODY MASS INDEX: 24.17 KG/M2

## 2020-10-27 DIAGNOSIS — Z12.83 SKIN CANCER SCREENING: ICD-10-CM

## 2020-10-27 DIAGNOSIS — L81.4 LENTIGINES: ICD-10-CM

## 2020-10-27 DIAGNOSIS — D22.9 MULTIPLE MELANOCYTIC NEVI: ICD-10-CM

## 2020-10-27 DIAGNOSIS — D18.01 CHERRY ANGIOMA: ICD-10-CM

## 2020-10-27 DIAGNOSIS — L82.1 SEBORRHEIC KERATOSES: ICD-10-CM

## 2020-10-27 DIAGNOSIS — D48.5 NEOPLASM OF UNCERTAIN BEHAVIOR OF SKIN: ICD-10-CM

## 2020-10-27 PROCEDURE — 99203 OFFICE O/P NEW LOW 30 MIN: CPT | Mod: 25 | Performed by: DERMATOLOGY

## 2020-10-27 PROCEDURE — 11103 TANGNTL BX SKIN EA SEP/ADDL: CPT | Performed by: DERMATOLOGY

## 2020-10-27 PROCEDURE — 11104 PUNCH BX SKIN SINGLE LESION: CPT | Performed by: DERMATOLOGY

## 2020-10-27 RX ORDER — ZOLPIDEM TARTRATE 10 MG/1
TABLET ORAL
COMMUNITY
Start: 2020-10-19

## 2020-10-27 RX ORDER — CARISOPRODOL 350 MG/1
TABLET ORAL
COMMUNITY
Start: 2020-10-19

## 2020-10-27 ASSESSMENT — FIBROSIS 4 INDEX: FIB4 SCORE: 0.55

## 2020-10-27 NOTE — PROGRESS NOTES
DERMATOLOGY NOTE  NEW VISIT       Chief complaint: Establish Care (STEPH)     HPI/location: Left arm, med to dark brown paple  Time present: 3 mos  Painful lesion: No  Itching lesion: No  Enlarging lesion: No  Anything make it better or worse? No    HPI/location: upper left arm, green papule, prev bx 20 years  Time present: 20 years,   Painful lesion: No  Itching lesion: No  Enlarging lesion: Yes  Anything make it better or worse? no    HPI/location: Upper left thigh  Time present: 2 - 3 years  Painful lesion: No  Itching lesion: No  Enlarging lesion: No   +changing color  Anything make it better or worse? no    History of skin cancer: No  History of precancers/actinic keratoses: No  History of biopsies:Yes, Details:  PSO  History of blistering/severe sunburns:Yes, Details: childhood  Family history of skin cancer:No  Family history of atypical moles:Yes, Details: Father    Past Medical History:   Diagnosis Date   • Hyperlipidemia    • Hypertension    • Thyroid disease         History reviewed. No pertinent family history.     Social History     Socioeconomic History   • Marital status: Single     Spouse name: Not on file   • Number of children: Not on file   • Years of education: Not on file   • Highest education level: Not on file   Occupational History   • Not on file   Social Needs   • Financial resource strain: Not on file   • Food insecurity     Worry: Not on file     Inability: Not on file   • Transportation needs     Medical: Not on file     Non-medical: Not on file   Tobacco Use   • Smoking status: Former Smoker     Types: Cigarettes     Quit date: 2009     Years since quittin.4   • Smokeless tobacco: Never Used   Substance and Sexual Activity   • Alcohol use: Yes     Comment: occasional   • Drug use: Yes     Types: Oral     Comment: marijuana drops while on vacation   • Sexual activity: Not on file   Lifestyle   • Physical activity     Days per week: Not on file     Minutes per session: Not on  "file   • Stress: Not on file   Relationships   • Social connections     Talks on phone: Not on file     Gets together: Not on file     Attends Rastafarian service: Not on file     Active member of club or organization: Not on file     Attends meetings of clubs or organizations: Not on file     Relationship status: Not on file   • Intimate partner violence     Fear of current or ex partner: Not on file     Emotionally abused: Not on file     Physically abused: Not on file     Forced sexual activity: Not on file   Other Topics Concern   • Not on file   Social History Narrative   • Not on file        No Known Allergies     MEDICATIONS:  Medications relevant to specialty reviewed.    Current Outpatient Medications:   •  carisoprodol (SOMA) 350 MG Tab, , Disp: , Rfl:   •  zolpidem (AMBIEN) 10 MG Tab, , Disp: , Rfl:   •  Evolocumab (REPATHA SC), Inject 1 Dose as instructed every 14 days., Disp: , Rfl:   •  fluconazole (DIFLUCAN) 200 MG Tab, Take 1 Tab by mouth every day. (Patient not taking: Reported on 10/27/2020), Disp: 3 Tab, Rfl: 0  •  atorvastatin (LIPITOR) 80 MG tablet, Take 80 mg by mouth every evening., Disp: , Rfl:   •  alprazolam (XANAX) 0.5 MG Tab, Take 0.5-1 mg by mouth every evening., Disp: , Rfl:      REVIEW OF SYSTEMS:   Positive for skin (see HPI)  Negative for fevers, chills and cough     EXAM:  Temp 36.8 °C (98.2 °F)   Ht 1.702 m (5' 7\")   Wt 69.9 kg (154 lb)   BMI 24.12 kg/m²   Constitutional: Well-developed, well-nourished, and in no distress.   Head: normocephalic and atraumatic.  Neck: Normal range of motion. Neck supple.   Pulmonary/Chest: Effort normal.   Neurological: Alert and oriented.    A total body skin exam was performed excluding the genitals per patient preference and including the following areas: head (including face), neck, chest, abdomen, groin/buttocks, back, bilateral upper extremities, and bilateral lower extremities with the following pertinent findings listed below. Remaining " above-listed examined areas within normal limits / negative for rashes or lesions.   - left shoulder with 4 mm blue grey monomorphic papule lying within scar  - left thigh with 5 mm light brown macule with central medium brown macule  -trunk and extremities with scattered medium brown uniform macules and papules all of which were morphologically similar and benign appearing on exam and with benign-appearing pigment network patterns on dermoscopy   -sun exposed skin of trunk and b/l upper and lower extremities with scattered clinically benign light brown reticulated macules all of which were morphologically similar and none of which were suspicious for skin cancer today on exam  -trunk and extremities with scattered brown-grey, waxy, hyperkeratotic papules and plaques  -trunk and extremities with cherry red macules and papules    IMPRESSION / PLAN:    1. Neoplasm of uncertain behavior of skin x2  - Discussed monitoring vs biopsy, patient agreeable to biopsy today, see procedure note below  - Biopsy Procedure Note: biopsy by shave technique  - Location: A. Left shoulder; B. Left thigh  - Size: as noted in exam  - Total specimens sent for pathology: 2  - Photo taken with patient permission (see media tab)  - Preoperative diagnosis: A. R/o blue nevus vs other (recurrent w/in scar); B. R/o atypia  - Plan pending path    A. Punch bx x1   After informed, written consent regarding the risks of scar, bleeding infection, numbness/nerve damage and discoloration was obtained and site confirmation with the patient, the site was cleaned with Hibiclens and infiltrated with 1% Lidocaine with epinephrine anesthesia.  A 6.0 mm punch biopsy was performed and hemostasis was achieved with 4.0 prolene suture.  The patient tolerated the procedure without incident.  Topical vaseline and a wound dressing was applied.  Wound care instructions were verbally discussed and a handout provided.   The specimen was labeled and sent to pathology for  "evaluation.  Suture to be removed in 10-12 days.     B. Shave bx x1   Risks, benefits and alternatives of procedure discussed, verbal consent obtained for photo and written informed consent obtained for procedure. Time out completed. Area of biopsy prepped with alcohol. Anesthesia with 1% lidocaine with epinephrine administered with 30 gauge needle. Shave biopsy of the site performed. Hemostasis achieved with aluminum chloride. Vaseline applied to wound with bandage. Patient tolerated procedure well and there were no complications. The specimen was sent to the pathology lab by the staff. Wound care was discussed.    2. Multiple melanocytic nevi  -Reviewed moles and melanoma. Patient advised to return sooner than scheduled if concerning changes in moles are noted.  -Reviewed sun protective behavior including sunscreen application and reapplication, appropriate choice of SPF, hat, protective clothing, seeking shade and never choosing to \"lie out\" in the sun.    3. Lentigines  - Discussed benign nature of lesions, related to sun exposure  - Discussed sun protection    4. Seborrheic keratoses  -nature of the diagnosis was discussed in detail  -clinically benign today on exam, reassurance was given  -continue to monitor for any changes, pt to call if any changes occur    5. Cherry angioma  -nature of the diagnosis was discussed in detail  -clinically benign today on exam, reassurance was given  -continue to monitor for any changes, pt to call if any changes occur'    6. Skin cancer screening  Skin cancer education  - discussed importance of sun protective clothing, eyewear  - discussed importance of daily use of broad spectrum sunscreen with SPF 30 or greater, as well as need for reapplication ~every 2 hours when exposed to UVR  - discussed importance of regular self-exams, ideally once per month, every 12 months exams in clinic  - ABCDE's of melanoma discussed  - patient to bring any new or concerning lesions to my " attention    Return to clinic in: Return in about 1 year (around 10/27/2021) for STEPH, sooner pending path. and as needed for any new or changing skin lesions.    Josefina Rebollar M.D.

## 2020-10-28 ENCOUNTER — HOSPITAL ENCOUNTER (OUTPATIENT)
Dept: RADIOLOGY | Facility: MEDICAL CENTER | Age: 40
End: 2020-10-28
Attending: FAMILY MEDICINE
Payer: COMMERCIAL

## 2020-10-28 DIAGNOSIS — E78.5 HYPERLIPIDEMIA, UNSPECIFIED HYPERLIPIDEMIA TYPE: ICD-10-CM

## 2020-10-28 PROCEDURE — 4410556 CT-CARDIAC SCORING

## 2020-11-04 ENCOUNTER — TELEPHONE (OUTPATIENT)
Dept: DERMATOLOGY | Facility: IMAGING CENTER | Age: 40
End: 2020-11-04

## 2020-11-05 NOTE — TELEPHONE ENCOUNTER
Per Dr. Rebollar, patient notified of benign pathology results from 10/27/20.  Explained dx of mild dysplasia and for need for follow up in 6 mos.  Patient expressed understanding and had no further questions.  6 mos recall added to chart.

## 2020-11-06 ENCOUNTER — NON-PROVIDER VISIT (OUTPATIENT)
Dept: DERMATOLOGY | Facility: IMAGING CENTER | Age: 40
End: 2020-11-06
Payer: COMMERCIAL

## 2020-11-06 NOTE — NON-PROVIDER
Candy Dey is a 40 y.o. female here for a Non-Provider Visit for Suture Removal.    Sutures were placed by Dr. Josefina Rebollar on date: 10/27/20  Skin is healed: Yes  Provider notified if skin is not healed, or if there is redness, heat, pain, or drainage from incision: N\A  Sutures removed.   Mastisol and steristips are placed: No    Advised to use emollient (vaseline, aquaphor, etc.) as needed, avoid peroxide and antibiotic ointment to reduce irritation.     Path report has been reviewed by provider.  Path report has reviewed with patient.

## 2020-12-11 ENCOUNTER — HOSPITAL ENCOUNTER (EMERGENCY)
Facility: MEDICAL CENTER | Age: 40
End: 2020-12-12
Attending: EMERGENCY MEDICINE
Payer: COMMERCIAL

## 2020-12-11 DIAGNOSIS — R52 PAIN: ICD-10-CM

## 2020-12-11 LAB
ANION GAP SERPL CALC-SCNC: 13 MMOL/L (ref 7–16)
BASOPHILS # BLD AUTO: 0.4 % (ref 0–1.8)
BASOPHILS # BLD: 0.04 K/UL (ref 0–0.12)
BUN SERPL-MCNC: 14 MG/DL (ref 8–22)
CALCIUM SERPL-MCNC: 9.9 MG/DL (ref 8.4–10.2)
CHLORIDE SERPL-SCNC: 100 MMOL/L (ref 96–112)
CK SERPL-CCNC: 203 U/L (ref 0–154)
CO2 SERPL-SCNC: 23 MMOL/L (ref 20–33)
CREAT SERPL-MCNC: 0.75 MG/DL (ref 0.5–1.4)
EOSINOPHIL # BLD AUTO: 0.02 K/UL (ref 0–0.51)
EOSINOPHIL NFR BLD: 0.2 % (ref 0–6.9)
ERYTHROCYTE [DISTWIDTH] IN BLOOD BY AUTOMATED COUNT: 39.3 FL (ref 35.9–50)
GLUCOSE SERPL-MCNC: 111 MG/DL (ref 65–99)
HCT VFR BLD AUTO: 44.9 % (ref 37–47)
HGB BLD-MCNC: 15.1 G/DL (ref 12–16)
IMM GRANULOCYTES # BLD AUTO: 0.03 K/UL (ref 0–0.11)
IMM GRANULOCYTES NFR BLD AUTO: 0.3 % (ref 0–0.9)
LYMPHOCYTES # BLD AUTO: 1.53 K/UL (ref 1–4.8)
LYMPHOCYTES NFR BLD: 17.1 % (ref 22–41)
MCH RBC QN AUTO: 30.5 PG (ref 27–33)
MCHC RBC AUTO-ENTMCNC: 33.6 G/DL (ref 33.6–35)
MCV RBC AUTO: 90.7 FL (ref 81.4–97.8)
MONOCYTES # BLD AUTO: 0.61 K/UL (ref 0–0.85)
MONOCYTES NFR BLD AUTO: 6.8 % (ref 0–13.4)
NEUTROPHILS # BLD AUTO: 6.73 K/UL (ref 2–7.15)
NEUTROPHILS NFR BLD: 75.2 % (ref 44–72)
NRBC # BLD AUTO: 0 K/UL
NRBC BLD-RTO: 0 /100 WBC
PLATELET # BLD AUTO: 297 K/UL (ref 164–446)
PMV BLD AUTO: 9.8 FL (ref 9–12.9)
POTASSIUM SERPL-SCNC: 4.2 MMOL/L (ref 3.6–5.5)
RBC # BLD AUTO: 4.95 M/UL (ref 4.2–5.4)
SODIUM SERPL-SCNC: 136 MMOL/L (ref 135–145)
WBC # BLD AUTO: 9 K/UL (ref 4.8–10.8)

## 2020-12-11 PROCEDURE — 85025 COMPLETE CBC W/AUTO DIFF WBC: CPT

## 2020-12-11 PROCEDURE — 96374 THER/PROPH/DIAG INJ IV PUSH: CPT

## 2020-12-11 PROCEDURE — 700111 HCHG RX REV CODE 636 W/ 250 OVERRIDE (IP): Performed by: EMERGENCY MEDICINE

## 2020-12-11 PROCEDURE — 99284 EMERGENCY DEPT VISIT MOD MDM: CPT

## 2020-12-11 PROCEDURE — 80048 BASIC METABOLIC PNL TOTAL CA: CPT

## 2020-12-11 PROCEDURE — 96375 TX/PRO/DX INJ NEW DRUG ADDON: CPT

## 2020-12-11 PROCEDURE — 82550 ASSAY OF CK (CPK): CPT

## 2020-12-11 RX ORDER — MORPHINE SULFATE 4 MG/ML
4 INJECTION, SOLUTION INTRAMUSCULAR; INTRAVENOUS ONCE
Status: COMPLETED | OUTPATIENT
Start: 2020-12-11 | End: 2020-12-11

## 2020-12-11 RX ORDER — KETOROLAC TROMETHAMINE 30 MG/ML
30 INJECTION, SOLUTION INTRAMUSCULAR; INTRAVENOUS ONCE
Status: COMPLETED | OUTPATIENT
Start: 2020-12-11 | End: 2020-12-11

## 2020-12-11 RX ORDER — LORAZEPAM 2 MG/ML
1 INJECTION INTRAMUSCULAR ONCE
Status: COMPLETED | OUTPATIENT
Start: 2020-12-11 | End: 2020-12-11

## 2020-12-11 RX ADMIN — KETOROLAC TROMETHAMINE 30 MG: 30 INJECTION, SOLUTION INTRAMUSCULAR; INTRAVENOUS at 22:52

## 2020-12-11 RX ADMIN — MORPHINE SULFATE 4 MG: 4 INJECTION INTRAVENOUS at 22:52

## 2020-12-11 RX ADMIN — LORAZEPAM 1 MG: 2 INJECTION INTRAMUSCULAR; INTRAVENOUS at 22:13

## 2020-12-11 ASSESSMENT — FIBROSIS 4 INDEX: FIB4 SCORE: 0.55

## 2020-12-12 VITALS
HEIGHT: 67 IN | WEIGHT: 161.38 LBS | SYSTOLIC BLOOD PRESSURE: 132 MMHG | DIASTOLIC BLOOD PRESSURE: 85 MMHG | HEART RATE: 55 BPM | OXYGEN SATURATION: 98 % | RESPIRATION RATE: 16 BRPM | BODY MASS INDEX: 25.33 KG/M2 | TEMPERATURE: 97.3 F

## 2020-12-12 NOTE — ED PROVIDER NOTES
"ED Provider Note    This patient was cared for during the COVID-19 pandemic. History and physical exam may be limited/truncated by the inherent challenges of PPE and the need to decrease staff exposure to novel coronavirus. Some aspects of disease management may be different to protect staff and help slow the spread of disease. I verified that, if possible, the patient was wearing a mask and I was wearing appropriate PPE every time I encountered the patient.       CHIEF COMPLAINT  Chief Complaint   Patient presents with   • Pain     \" entire body \" Int Current episode since 1722       HPI  Candy Dey is a 40 y.o. female who presents with pain all over.  Patient says around 5:00 she took a Bactrim that was prescribed by her primary care doctor just a few days ago for a possible cellulitis of a biopsy site on her left leg.  A biopsy was done over a month ago.  She says she took the Bactrim around 5:00 and then approximately 22 minutes later she started having itching and all over pain.  She describes it as if her \"body is being crushed by a trash compactor\" she took 3 Benadryl's for the itching all over and continues to have this pain and is extremely uncomfortable.  She says this feels very similar to when she was here in May at that time initially her symptoms were thought to be secondary to statin myopathy but then when she was admitted her symptoms appeared to be from acute narcotic withdrawal.  She says she is not on any pain medications at this time.  She did start taking 20 mg of a statin 1 to 2 weeks ago she was previously on 80 mg.  She denies any chest pain shortness of breath.  No recent fevers chills no swelling of her lower extremities.        REVIEW OF SYSTEMS  See HPI. All other systems are negative.     PAST MEDICAL HISTORY   has a past medical history of Hyperlipidemia, Hypertension, and Thyroid disease.    SOCIAL HISTORY  Social History     Tobacco Use   • Smoking status: Former Smoker     " "Types: Cigarettes     Quit date: 2009     Years since quittin.6   • Smokeless tobacco: Never Used   Substance and Sexual Activity   • Alcohol use: Yes     Comment: occasional   • Drug use: Yes     Types: Oral     Comment: marijuana drops while on vacation   • Sexual activity: Not on file       SURGICAL HISTORY   has a past surgical history that includes abdominal hysterectomy total ().    CURRENT MEDICATIONS  Home Medications    **Home medications have not yet been reviewed for this encounter**         ALLERGIES  No Known Allergies    PHYSICAL EXAM  VITAL SIGNS: /73   Pulse 82   Temp 36.3 °C (97.3 °F) (Temporal)   Resp 20   Ht 1.702 m (5' 7\")   Wt 73.2 kg (161 lb 6 oz)   SpO2 96%   BMI 25.28 kg/m² .  Constitutional: Alert unable to stay still uncomfortable appearing writhing around  HENT: No signs of trauma, Bilateral external ears normal, Nose normal.   Eyes: Pupils are equal and reactive, Conjunctiva normal, Non-icteric.   Neck: Normal range of motion, No tenderness, Supple, No stridor.   Cardiovascular: Regular rate and rhythm, no murmurs.   Thorax & Lungs: Normal breath sounds, No respiratory distress, No wheezing, No chest tenderness.   Abdomen: Bowel sounds normal, Soft, No tenderness, No masses, No peritoneal signs.  Skin: Warm, Dry, No erythema, No rash.   Musculoskeletal:  no major deformities noted.   Neurologic: Alert,  No focal deficits noted.   Psychiatric: Affect normal, Judgment normal, Mood normal.       DIAGNOSTIC STUDIES / PROCEDURES      EKG  Results for orders placed or performed during the hospital encounter of 08/19/15   EKG (NOW)   Result Value Ref Range    Report SINUS RHYTHM          LABS  Labs Reviewed   CBC WITH DIFFERENTIAL - Abnormal; Notable for the following components:       Result Value    Neutrophils-Polys 75.20 (*)     Lymphocytes 17.10 (*)     All other components within normal limits   BASIC METABOLIC PANEL - Abnormal; Notable for the following " components:    Glucose 111 (*)     All other components within normal limits   CREATINE KINASE - Abnormal; Notable for the following components:    CPK Total 203 (*)     All other components within normal limits   ESTIMATED GFR       RADIOLOGY  No orders to display       Medical records reviewed for continuity of care.  Prior hospitalization was reviewed      COURSE & MEDICAL DECISION MAKING  Pertinent Labs & Imaging studies reviewed. (See chart for details)  This is a 40-year-old that presents with all over pain.  This occurred after taking Bactrim.  However she has no evidence of rash difficulty breathing concerning for anaphylaxis.    Patient's labs are essentially normal her CPK is minimally elevated over the upper limit of normal however I do not think this is indicative of myositis/myopathy.  It is unclear what her symptoms are from however she is feeling better after Toradol and 1 dose of morphine and will be discharged home.  The area on her leg I do not feel is acutely infected and have advised her to discontinue the antibiotics at this time.     The patient will return for new or worsening symptoms and is stable at the time of discharge. Patient was given return precautions. Patient and/or family member verbalizes understanding and will comply.    DISPOSITION:  Patient will be discharged home in stable condition.    FOLLOW UP:  Jose Saldana M.D.  [address] [address]  [phone]      As needed    [facility], Emergency Dept  [address]  [place] [address] [phone]    Return for worsening pain, rash, difficulty breathing or other concerns      OUTPATIENT MEDICATIONS:  New Prescriptions    No medications on file         Your blood pressure is elevated here in the emergency department. Please monitor your blood pressure over the next several days. If your blood pressure continues to be 120/80 or higher please contact your physician for blood pressure  management.       FINAL IMPRESSION  1. Pain         2.   3.    This dictation has been creating using voice recognition software. The accuracy of the dictation is limited the abilities of the software.  I expect there may be some errors of grammar and possibly content. I made every attempt to manually correct the errors within my dictation. However errors related to this voice recognition software may still exist and should be interpreted within the appropriate context.      The note accurately reflects work and decisions made by me.  Elaine Thomas M.D.  12/12/2020  12:01 AM

## 2020-12-22 ENCOUNTER — HOSPITAL ENCOUNTER (OUTPATIENT)
Dept: RADIOLOGY | Facility: MEDICAL CENTER | Age: 40
End: 2020-12-22
Attending: FAMILY MEDICINE
Payer: COMMERCIAL

## 2020-12-22 DIAGNOSIS — E78.00 PURE HYPERCHOLESTEROLEMIA: ICD-10-CM

## 2020-12-22 PROCEDURE — 93880 EXTRACRANIAL BILAT STUDY: CPT

## 2021-01-06 ENCOUNTER — OFFICE VISIT (OUTPATIENT)
Dept: CARDIOLOGY | Facility: MEDICAL CENTER | Age: 41
End: 2021-01-06
Payer: COMMERCIAL

## 2021-01-06 VITALS
WEIGHT: 153 LBS | DIASTOLIC BLOOD PRESSURE: 68 MMHG | HEIGHT: 67 IN | SYSTOLIC BLOOD PRESSURE: 112 MMHG | BODY MASS INDEX: 24.01 KG/M2 | HEART RATE: 86 BPM | OXYGEN SATURATION: 96 %

## 2021-01-06 DIAGNOSIS — R74.8 ELEVATED CPK: ICD-10-CM

## 2021-01-06 DIAGNOSIS — E78.01 FAMILIAL HYPERCHOLESTEROLEMIA: ICD-10-CM

## 2021-01-06 DIAGNOSIS — R93.1 AGATSTON CORONARY ARTERY CALCIUM SCORE BETWEEN 200 AND 399: ICD-10-CM

## 2021-01-06 PROCEDURE — 93000 ELECTROCARDIOGRAM COMPLETE: CPT | Performed by: INTERNAL MEDICINE

## 2021-01-06 PROCEDURE — 99202 OFFICE O/P NEW SF 15 MIN: CPT | Performed by: INTERNAL MEDICINE

## 2021-01-06 RX ORDER — ATORVASTATIN CALCIUM 80 MG/1
80 TABLET, FILM COATED ORAL
Qty: 90 TAB | Refills: 3 | Status: SHIPPED | OUTPATIENT
Start: 2021-01-06

## 2021-01-06 RX ORDER — ATORVASTATIN CALCIUM 20 MG/1
TABLET, FILM COATED ORAL
COMMUNITY
Start: 2020-11-02 | End: 2021-01-06

## 2021-01-06 RX ORDER — METHYLPREDNISOLONE 4 MG/1
TABLET ORAL
COMMUNITY
Start: 2020-12-16 | End: 2021-01-06

## 2021-01-06 RX ORDER — EVOLOCUMAB 140 MG/ML
INJECTION, SOLUTION SUBCUTANEOUS
COMMUNITY
Start: 2020-12-21

## 2021-01-06 RX ORDER — FUROSEMIDE 20 MG/1
20 TABLET ORAL
COMMUNITY
Start: 2020-12-02 | End: 2021-01-06

## 2021-01-06 RX ORDER — SULFAMETHOXAZOLE AND TRIMETHOPRIM 800; 160 MG/1; MG/1
TABLET ORAL
COMMUNITY
Start: 2020-12-09 | End: 2021-01-06

## 2021-01-06 RX ORDER — THYROID 30 MG/1
30 TABLET ORAL DAILY
COMMUNITY

## 2021-01-06 RX ORDER — IBUPROFEN 800 MG/1
TABLET ORAL
COMMUNITY
Start: 2020-12-09 | End: 2021-01-06

## 2021-01-06 SDOH — HEALTH STABILITY: MENTAL HEALTH: HOW OFTEN DO YOU HAVE A DRINK CONTAINING ALCOHOL?: MONTHLY OR LESS

## 2021-01-06 SDOH — HEALTH STABILITY: MENTAL HEALTH: HOW OFTEN DO YOU HAVE 6 OR MORE DRINKS ON ONE OCCASION?: NEVER

## 2021-01-06 SDOH — HEALTH STABILITY: MENTAL HEALTH: HOW MANY STANDARD DRINKS CONTAINING ALCOHOL DO YOU HAVE ON A TYPICAL DAY?: 1 OR 2

## 2021-01-06 ASSESSMENT — FIBROSIS 4 INDEX: FIB4 SCORE: 0.54

## 2021-01-06 NOTE — PROGRESS NOTES
Cardiology Initial Consultation Note    Date of note:    1/6/2021    Primary Care Provider: Jose Saldana M.D.  Referring Provider: Taj Markham,    Patient Name: Candy Dey   YOB: 1980  MRN:              [id number]    Chief Complaint: familial hypercholesterolemia    History of Present Illness: Candy Dey is a 40 y.o. female whose current medical problems include familial hypercholesterolemia who is here for cardiac consultation for primary prevention.    Broke ankle and wrist from a fall off a ladder in May. Had difficulties with severepain and was told in the hospital that she had statin induced myopathy. Interestingly, she was on bactrim at that time as well. She stopped her statin, and then in October she started lipitor 20mg once a day. Around that time, she had some skin lesions removed and was started on bactrim. She again had a severe feeling of full body pain, and CPK was elevated on admission and evaluation in the ER.     She is worried about being on statins due to concern for myopathy and memory loss.     Has a peloton, does 30-60 minutes.     ROS  + insomnia    Constitution: Negative for chills, fever and night sweats.   HENT: Negative for nosebleeds.    Eyes: Negative for vision loss in left eye and vision loss in right eye.   Respiratory: Negative for hemoptysis.    Gastrointestinal: Negative for hematemesis, hematochezia and melena.   Genitourinary: Negative for hematuria.   Neurological: Negative for focal weakness, numbness and paresthesias.       All other systems reviewed and discussed using a comprehensive questionnaire and are negative.       Past Medical History:   Diagnosis Date   • Familial hyperlipidemia    • Thyroid disease          Past Surgical History:   Procedure Laterality Date   • ABDOMINAL HYSTERECTOMY TOTAL  2004         Current Outpatient Medications   Medication Sig Dispense Refill   • thyroid (ARMOUR THYROID) 30 MG Tab Take 30 mg by  mouth every day. 3 GRAINS.     • REPATHA SURECLICK 140 MG/ML Solution Auto-injector      • carisoprodol (SOMA) 350 MG Tab      • zolpidem (AMBIEN) 10 MG Tab      • Evolocumab (REPATHA SC) Inject 1 Dose as instructed every 14 days.       No current facility-administered medications for this visit.          Allergies   Allergen Reactions   • Bactrim Ds      Fully body pain and myopathy, severe   • Sulfa Drugs          Family History   Problem Relation Age of Onset   • Heart Disease Father 40        heart attack   • Heart Attack Paternal Grandfather 60         Social History     Socioeconomic History   • Marital status: Single     Spouse name: Not on file   • Number of children: Not on file   • Years of education: Not on file   • Highest education level: Not on file   Occupational History   • Not on file   Social Needs   • Financial resource strain: Not on file   • Food insecurity     Worry: Not on file     Inability: Not on file   • Transportation needs     Medical: Not on file     Non-medical: Not on file   Tobacco Use   • Smoking status: Former Smoker     Packs/day: 0.25     Years: 20.00     Pack years: 5.00     Types: Cigarettes     Quit date: 2020     Years since quittin.6   • Smokeless tobacco: Never Used   Substance and Sexual Activity   • Alcohol use: Yes     Frequency: Monthly or less     Drinks per session: 1 or 2     Binge frequency: Never     Comment: occasional   • Drug use: Not Currently     Types: Oral     Comment: marijuana drops while on vacation   • Sexual activity: Not on file   Lifestyle   • Physical activity     Days per week: Not on file     Minutes per session: Not on file   • Stress: Not on file   Relationships   • Social connections     Talks on phone: Not on file     Gets together: Not on file     Attends Christianity service: Not on file     Active member of club or organization: Not on file     Attends meetings of clubs or organizations: Not on file     Relationship status: Not on file  "  • Intimate partner violence     Fear of current or ex partner: Not on file     Emotionally abused: Not on file     Physically abused: Not on file     Forced sexual activity: Not on file   Other Topics Concern   • Not on file   Social History Narrative    Realtor         Physical Exam:  Ambulatory Vitals  /68 (BP Location: Left arm, Patient Position: Sitting, BP Cuff Size: Adult)   Pulse 86   Ht 1.702 m (5' 7\")   Wt 69.4 kg (153 lb)   SpO2 96%    Oxygen Therapy:  Pulse Oximetry: 96 %  BP Readings from Last 4 Encounters:   01/06/21 112/68   12/12/20 132/85   05/31/20 118/78   05/29/20 120/77       Weight/BMI: Body mass index is 23.96 kg/m².  Wt Readings from Last 4 Encounters:   01/06/21 69.4 kg (153 lb)   12/11/20 73.2 kg (161 lb 6 oz)   10/27/20 69.9 kg (154 lb)   05/29/20 72 kg (158 lb 11.7 oz)           General: Well appearing and in no apparent distress  Eyes: nl conjunctiva  ENT: OP covered by mask, normal external appearance of nose and ears  Neck: JVP 4 cm H2O, no carotid bruits  Lungs: normal respiratory effort, CTAB  Heart: RRR, no murmurs, no rubs or gallops, no edema bilateral lower extremities. No LV/RV heave on cardiac palpatation. 2+ bilateral radial pulses.  2+ bilateral dp pulses.   Abdomen: soft, non tender, non distended, no masses, normal bowel sounds.  No HSM.  Extremities/MSK: no clubbing, no cyanosis  Neurological: No focal sensory deficits  Psychiatric: Appropriate affect, A/O x 3, intact judgement and insight  Skin: Warm extremities      Lab Data Review:  Lab Results   Component Value Date/Time    CHOLSTRLTOT 223 (H) 06/11/2020 08:16 AM     (H) 06/11/2020 08:16 AM    HDL 45 06/11/2020 08:16 AM    TRIGLYCERIDE 234 (H) 06/11/2020 08:16 AM       Lab Results   Component Value Date/Time    SODIUM 136 12/11/2020 10:00 PM    POTASSIUM 4.2 12/11/2020 10:00 PM    CHLORIDE 100 12/11/2020 10:00 PM    CO2 23 12/11/2020 10:00 PM    GLUCOSE 111 (H) 12/11/2020 10:00 PM    BUN 14 12/11/2020 " 10:00 PM    CREATININE 0.75 12/11/2020 10:00 PM    CREATININE 0.8 12/02/2005 03:00 PM     Lab Results   Component Value Date/Time    ALKPHOSPHAT 73 06/11/2020 08:16 AM    ASTSGOT 30 06/11/2020 08:16 AM    ALTSGPT 57 (H) 06/11/2020 08:16 AM    TBILIRUBIN 0.5 06/11/2020 08:16 AM      Lab Results   Component Value Date/Time    WBC 9.0 12/11/2020 10:00 PM     No components found for: HBGA1C  No components found for: TROPONIN  No results found for: BNP      Cardiac Imaging and Procedures Review:    EKG dated 1/6/2021 : My personal interpretation is NSR, normal EKG.     Exercise stress testing (2015):   Echocardiography Laboratory  CONCLUSIONS  Unremarkable limited Echo.  Normal treadmill stress echocardiogram.       Radiology test Review:  Carotid Artery Ultrasound (12/22/2020):    Report      Vascular Laboratory   CONCLUSIONS   Mild stenosis of the right internal carotid (< 50%).    Normal left carotid exam.     CCS 10/28/2020:  Coronary calcification:  LMA - 0.0  LCX - 0.0  LAD -  232  RCA - 0.0  PDA - 0.0     Total Calcium Score: 232     Percentile: Calcium score is above the 90th percentile for the patient's age and sex.     Other findings:  Heart: Normal size.  Lungs: Clear.  Mediastinum: Normal.  Upper abdomen: Normal.    Coronary CTA 2017:  Coronary calcification:  LMA - 0  LCX - 0  LAD - 79.5  RCA - 0  PDA - 0     Calcium Score:  79.5     Coronary CTA:     Left main: Widely patent     LAD and Diagonals: Atherosclerosis in the mid LAD with some positive remodeling and no stenosis reaching 50% threshold     LCX and Obtuse Marginals: Widely patent     RCA and Posterior Descending: Widely patent, dominant     Cardiac Structure and Morphology:  Within normal limits. No chamber dilatation or evident septal defect.     CT Chest:  There is no pulmonary consolidation. No pleural space process is seen. There is no abnormal volume of pericardial fluid.      IMPRESSION:     1.  Despite limitation of hypotension preventing  "administration nitroglycerin, THERE IS DEFINITE, AT LEAST MILD ATHEROSCLEROTIC PLAQUE BURDEN but no hemodynamically significant stenosis is seen.     2. Given history indicates \"chest pain\", functional analysis is recommended.     3.  THE PATIENT?S CARDIOVASCULAR RISK IS MODERATE.     4.  CARDIOVASCULAR RISK FACTOR MODIFICATION IS SUGGESTED.     5.  FURTHER EVALUATION SHOULD BE BASED UPON GLOBAL ASSESSMENT OF CARDIOVASCULAR RISK FACTORS IN ADDITION TO THE RESULTS OF THIS TEST.         Medical Decision Makin. Familial hypercholesterolemia  Severe with early MI in her father, and LDL cholesterol extremely elevated even while on statin therapy (187 2017). Her LDL remains elevated at 131 while on repatha monotherapy.  -restart lipitor 80mg PO Daily  -BMP, CPK, lipid panel in 6 weeks, will r/o statin induced myositis which I think is very unlikely the cause of her elevated CPK. Much more likely it was her clear allergy to bactrim.   -will look into genetic testing options  -no children current and s/p KIKI.   -discussed how her memory difficulties may be secondary to lack of statin use and cholesterol deposition in her cerebrovasculature.     2. Agatston coronary artery calcium score between 200 and 399  LAD plaque by coronary CTA, partially calcified.   -start aspirin 81mg PO Daily for primary prevention. May hold for 5-7 days if necessary for any procedures.   -cholesterol control as per above.     3. Elevated CPK  As per above.         Return in about 1 year (around 2022).      Dewayne Ray MD, [facility]  [facility], [address]  [address] [address]  Phone: [phone]  Fax: [phone]            "

## 2021-01-07 ENCOUNTER — TELEPHONE (OUTPATIENT)
Dept: CARDIOLOGY | Facility: MEDICAL CENTER | Age: 41
End: 2021-01-07

## 2021-01-07 LAB — EKG IMPRESSION: NORMAL

## 2021-01-07 NOTE — TELEPHONE ENCOUNTER
ANGELO    NM: Ivonne DILLON/ [facility]    PH: [phone]   PT NM: Tiburcio Candy   : 1980   RE: Needing clarification on  Atorvastatin 80 mg, Would like to know  if Rx is being changed from 20-80mg      --------------------------------------  Message History  Account: [id number]  Taken:  Thu 2021  1:50p TQ  Serial#: [id number]

## 2021-01-07 NOTE — PATIENT INSTRUCTIONS
Please restart lipitor 80mg once a day.     Please get fasting blood tests in 6-8 weeks.     Please start aspirin 81mg once a day.

## 2021-01-09 NOTE — TELEPHONE ENCOUNTER
"Called pharmacy back and clarified order.  Atorvastatin 80 mg was ordered by ANGELO THOMAS.  Pharmacy tech stated \"Ok we got it, thanks for calling us back\".   "

## 2021-01-30 ENCOUNTER — HOSPITAL ENCOUNTER (OUTPATIENT)
Dept: RADIOLOGY | Facility: MEDICAL CENTER | Age: 41
End: 2021-01-30
Attending: FAMILY MEDICINE
Payer: COMMERCIAL

## 2021-01-30 DIAGNOSIS — M54.12 RADICULOPATHY, CERVICAL: ICD-10-CM

## 2021-01-30 PROCEDURE — 72141 MRI NECK SPINE W/O DYE: CPT

## 2021-02-02 ENCOUNTER — HOSPITAL ENCOUNTER (OUTPATIENT)
Dept: LAB | Facility: MEDICAL CENTER | Age: 41
End: 2021-02-02
Attending: FAMILY MEDICINE
Payer: COMMERCIAL

## 2021-02-02 LAB
ALBUMIN SERPL BCP-MCNC: 4.1 G/DL (ref 3.2–4.9)
ALBUMIN/GLOB SERPL: 1.6 G/DL
ALP SERPL-CCNC: 74 U/L (ref 30–99)
ALT SERPL-CCNC: 35 U/L (ref 2–50)
AMORPH CRY #/AREA URNS HPF: PRESENT /HPF
ANION GAP SERPL CALC-SCNC: 8 MMOL/L (ref 7–16)
APPEARANCE UR: ABNORMAL
AST SERPL-CCNC: 29 U/L (ref 12–45)
BACTERIA #/AREA URNS HPF: NEGATIVE /HPF
BASOPHILS # BLD AUTO: 0.6 % (ref 0–1.8)
BASOPHILS # BLD: 0.04 K/UL (ref 0–0.12)
BILIRUB SERPL-MCNC: 0.6 MG/DL (ref 0.1–1.5)
BILIRUB UR QL STRIP.AUTO: NEGATIVE
BUN SERPL-MCNC: 15 MG/DL (ref 8–22)
CALCIUM SERPL-MCNC: 9.7 MG/DL (ref 8.5–10.5)
CHLORIDE SERPL-SCNC: 104 MMOL/L (ref 96–112)
CHOLEST SERPL-MCNC: 95 MG/DL (ref 100–199)
CO2 SERPL-SCNC: 24 MMOL/L (ref 20–33)
COLOR UR: YELLOW
CREAT SERPL-MCNC: 0.71 MG/DL (ref 0.5–1.4)
EOSINOPHIL # BLD AUTO: 0.1 K/UL (ref 0–0.51)
EOSINOPHIL NFR BLD: 1.4 % (ref 0–6.9)
EPI CELLS #/AREA URNS HPF: NORMAL /HPF
ERYTHROCYTE [DISTWIDTH] IN BLOOD BY AUTOMATED COUNT: 45.1 FL (ref 35.9–50)
EST. AVERAGE GLUCOSE BLD GHB EST-MCNC: 117 MG/DL
FASTING STATUS PATIENT QL REPORTED: NORMAL
GLOBULIN SER CALC-MCNC: 2.6 G/DL (ref 1.9–3.5)
GLUCOSE SERPL-MCNC: 94 MG/DL (ref 65–99)
GLUCOSE UR STRIP.AUTO-MCNC: NEGATIVE MG/DL
HBA1C MFR BLD: 5.7 % (ref 0–5.6)
HCT VFR BLD AUTO: 46.7 % (ref 37–47)
HDLC SERPL-MCNC: 45 MG/DL
HGB BLD-MCNC: 15 G/DL (ref 12–16)
IMM GRANULOCYTES # BLD AUTO: 0.02 K/UL (ref 0–0.11)
IMM GRANULOCYTES NFR BLD AUTO: 0.3 % (ref 0–0.9)
IRON SATN MFR SERPL: 43 % (ref 15–55)
IRON SERPL-MCNC: 132 UG/DL (ref 40–170)
KETONES UR STRIP.AUTO-MCNC: NEGATIVE MG/DL
LDLC SERPL CALC-MCNC: 34 MG/DL
LEUKOCYTE ESTERASE UR QL STRIP.AUTO: NEGATIVE
LYMPHOCYTES # BLD AUTO: 1.54 K/UL (ref 1–4.8)
LYMPHOCYTES NFR BLD: 21.9 % (ref 22–41)
MCH RBC QN AUTO: 30.6 PG (ref 27–33)
MCHC RBC AUTO-ENTMCNC: 32.1 G/DL (ref 33.6–35)
MCV RBC AUTO: 95.3 FL (ref 81.4–97.8)
MICRO URNS: ABNORMAL
MONOCYTES # BLD AUTO: 0.43 K/UL (ref 0–0.85)
MONOCYTES NFR BLD AUTO: 6.1 % (ref 0–13.4)
NEUTROPHILS # BLD AUTO: 4.9 K/UL (ref 2–7.15)
NEUTROPHILS NFR BLD: 69.7 % (ref 44–72)
NITRITE UR QL STRIP.AUTO: NEGATIVE
NRBC # BLD AUTO: 0 K/UL
NRBC BLD-RTO: 0 /100 WBC
PH UR STRIP.AUTO: 5.5 [PH] (ref 5–8)
PLATELET # BLD AUTO: 296 K/UL (ref 164–446)
PMV BLD AUTO: 10.6 FL (ref 9–12.9)
POTASSIUM SERPL-SCNC: 4.5 MMOL/L (ref 3.6–5.5)
PROT SERPL-MCNC: 6.7 G/DL (ref 6–8.2)
PROT UR QL STRIP: NEGATIVE MG/DL
RBC # BLD AUTO: 4.9 M/UL (ref 4.2–5.4)
RBC UR QL AUTO: NEGATIVE
RHEUMATOID FACT SER IA-ACNC: <10 IU/ML (ref 0–14)
SODIUM SERPL-SCNC: 136 MMOL/L (ref 135–145)
SP GR UR REFRACTOMETRY: 1.02
TIBC SERPL-MCNC: 310 UG/DL (ref 250–450)
TRIGL SERPL-MCNC: 81 MG/DL (ref 0–149)
UIBC SERPL-MCNC: 178 UG/DL (ref 110–370)
URATE SERPL-MCNC: 5.3 MG/DL (ref 1.9–8.2)
UROBILINOGEN UR STRIP.AUTO-MCNC: 0.2 MG/DL
WBC # BLD AUTO: 7 K/UL (ref 4.8–10.8)

## 2021-02-02 PROCEDURE — 84550 ASSAY OF BLOOD/URIC ACID: CPT

## 2021-02-02 PROCEDURE — 80053 COMPREHEN METABOLIC PANEL: CPT

## 2021-02-02 PROCEDURE — 83036 HEMOGLOBIN GLYCOSYLATED A1C: CPT

## 2021-02-02 PROCEDURE — 86038 ANTINUCLEAR ANTIBODIES: CPT

## 2021-02-02 PROCEDURE — 85025 COMPLETE CBC W/AUTO DIFF WBC: CPT

## 2021-02-02 PROCEDURE — 86431 RHEUMATOID FACTOR QUANT: CPT

## 2021-02-02 PROCEDURE — 84439 ASSAY OF FREE THYROXINE: CPT

## 2021-02-02 PROCEDURE — 84443 ASSAY THYROID STIM HORMONE: CPT

## 2021-02-02 PROCEDURE — 83550 IRON BINDING TEST: CPT

## 2021-02-02 PROCEDURE — 82728 ASSAY OF FERRITIN: CPT

## 2021-02-02 PROCEDURE — 80061 LIPID PANEL: CPT

## 2021-02-02 PROCEDURE — 36415 COLL VENOUS BLD VENIPUNCTURE: CPT

## 2021-02-02 PROCEDURE — 83540 ASSAY OF IRON: CPT

## 2021-02-02 PROCEDURE — 81001 URINALYSIS AUTO W/SCOPE: CPT

## 2021-02-03 LAB
FERRITIN SERPL-MCNC: 30.8 NG/ML (ref 10–291)
T4 FREE SERPL-MCNC: 1.27 NG/DL (ref 0.93–1.7)
T4 SERPL-MCNC: 6.4 UG/DL (ref 4–12)
TSH SERPL DL<=0.005 MIU/L-ACNC: 0.01 UIU/ML (ref 0.38–5.33)

## 2021-02-04 LAB — NUCLEAR IGG SER QL IA: NORMAL

## 2021-03-02 ENCOUNTER — HOSPITAL ENCOUNTER (OUTPATIENT)
Dept: RADIOLOGY | Facility: MEDICAL CENTER | Age: 41
End: 2021-03-02
Attending: FAMILY MEDICINE
Payer: COMMERCIAL

## 2021-03-02 DIAGNOSIS — Z12.31 VISIT FOR SCREENING MAMMOGRAM: ICD-10-CM

## 2021-03-02 PROCEDURE — 77063 BREAST TOMOSYNTHESIS BI: CPT

## 2021-08-13 ENCOUNTER — HOSPITAL ENCOUNTER (OUTPATIENT)
Dept: LAB | Facility: MEDICAL CENTER | Age: 41
End: 2021-08-13
Attending: OBSTETRICS & GYNECOLOGY
Payer: COMMERCIAL

## 2021-08-13 ENCOUNTER — APPOINTMENT (OUTPATIENT)
Dept: LAB | Facility: MEDICAL CENTER | Age: 41
End: 2021-08-13
Payer: COMMERCIAL

## 2021-08-13 LAB
25(OH)D3 SERPL-MCNC: 35 NG/ML (ref 30–100)
T3FREE SERPL-MCNC: 3.15 PG/ML (ref 2–4.4)
T4 FREE SERPL-MCNC: 0.98 NG/DL (ref 0.93–1.7)
TSH SERPL DL<=0.005 MIU/L-ACNC: 0.02 UIU/ML (ref 0.38–5.33)

## 2021-08-13 PROCEDURE — 84481 FREE ASSAY (FT-3): CPT

## 2021-08-13 PROCEDURE — 84443 ASSAY THYROID STIM HORMONE: CPT

## 2021-08-13 PROCEDURE — 82306 VITAMIN D 25 HYDROXY: CPT

## 2021-08-13 PROCEDURE — 36415 COLL VENOUS BLD VENIPUNCTURE: CPT

## 2021-08-13 PROCEDURE — 86800 THYROGLOBULIN ANTIBODY: CPT

## 2021-08-13 PROCEDURE — 86376 MICROSOMAL ANTIBODY EACH: CPT

## 2021-08-13 PROCEDURE — 84439 ASSAY OF FREE THYROXINE: CPT

## 2021-08-16 LAB
THYROGLOB AB SERPL-ACNC: <0.9 IU/ML (ref 0–4)
THYROPEROXIDASE AB SERPL-ACNC: 0.4 IU/ML (ref 0–9)

## 2021-12-13 ENCOUNTER — HOSPITAL ENCOUNTER (OUTPATIENT)
Dept: LAB | Facility: MEDICAL CENTER | Age: 41
End: 2021-12-13
Attending: NURSE PRACTITIONER
Payer: COMMERCIAL

## 2021-12-13 LAB
ALBUMIN SERPL BCP-MCNC: 4.6 G/DL (ref 3.2–4.9)
ALBUMIN/GLOB SERPL: 1.8 G/DL
ALP SERPL-CCNC: 64 U/L (ref 30–99)
ALT SERPL-CCNC: 23 U/L (ref 2–50)
ANION GAP SERPL CALC-SCNC: 13 MMOL/L (ref 7–16)
AST SERPL-CCNC: 20 U/L (ref 12–45)
BASOPHILS # BLD AUTO: 1.2 % (ref 0–1.8)
BASOPHILS # BLD: 0.06 K/UL (ref 0–0.12)
BILIRUB SERPL-MCNC: 0.5 MG/DL (ref 0.1–1.5)
BUN SERPL-MCNC: 14 MG/DL (ref 8–22)
CALCIUM SERPL-MCNC: 9.5 MG/DL (ref 8.5–10.5)
CHLORIDE SERPL-SCNC: 105 MMOL/L (ref 96–112)
CHOLEST SERPL-MCNC: 315 MG/DL (ref 100–199)
CO2 SERPL-SCNC: 23 MMOL/L (ref 20–33)
CREAT SERPL-MCNC: 0.75 MG/DL (ref 0.5–1.4)
EOSINOPHIL # BLD AUTO: 0.07 K/UL (ref 0–0.51)
EOSINOPHIL NFR BLD: 1.4 % (ref 0–6.9)
ERYTHROCYTE [DISTWIDTH] IN BLOOD BY AUTOMATED COUNT: 41.2 FL (ref 35.9–50)
ESTRADIOL SERPL-MCNC: 18.6 PG/ML
FASTING STATUS PATIENT QL REPORTED: NORMAL
FSH SERPL-ACNC: 77 MIU/ML
GLOBULIN SER CALC-MCNC: 2.6 G/DL (ref 1.9–3.5)
GLUCOSE SERPL-MCNC: 92 MG/DL (ref 65–99)
HCT VFR BLD AUTO: 43.4 % (ref 37–47)
HDLC SERPL-MCNC: 58 MG/DL
HGB BLD-MCNC: 14.4 G/DL (ref 12–16)
IMM GRANULOCYTES # BLD AUTO: 0 K/UL (ref 0–0.11)
IMM GRANULOCYTES NFR BLD AUTO: 0 % (ref 0–0.9)
LDLC SERPL CALC-MCNC: 231 MG/DL
LYMPHOCYTES # BLD AUTO: 1.49 K/UL (ref 1–4.8)
LYMPHOCYTES NFR BLD: 30.7 % (ref 22–41)
MCH RBC QN AUTO: 31.7 PG (ref 27–33)
MCHC RBC AUTO-ENTMCNC: 33.2 G/DL (ref 33.6–35)
MCV RBC AUTO: 95.6 FL (ref 81.4–97.8)
MONOCYTES # BLD AUTO: 0.35 K/UL (ref 0–0.85)
MONOCYTES NFR BLD AUTO: 7.2 % (ref 0–13.4)
NEUTROPHILS # BLD AUTO: 2.88 K/UL (ref 2–7.15)
NEUTROPHILS NFR BLD: 59.5 % (ref 44–72)
NRBC # BLD AUTO: 0 K/UL
NRBC BLD-RTO: 0 /100 WBC
PLATELET # BLD AUTO: 272 K/UL (ref 164–446)
PMV BLD AUTO: 10 FL (ref 9–12.9)
POTASSIUM SERPL-SCNC: 3.6 MMOL/L (ref 3.6–5.5)
PROT SERPL-MCNC: 7.2 G/DL (ref 6–8.2)
RBC # BLD AUTO: 4.54 M/UL (ref 4.2–5.4)
SODIUM SERPL-SCNC: 141 MMOL/L (ref 135–145)
T3FREE SERPL-MCNC: 2.57 PG/ML (ref 2–4.4)
T4 SERPL-MCNC: 8.3 UG/DL (ref 4–12)
TESTOST SERPL-MCNC: <10 NG/DL (ref 9–75)
THYROPEROXIDASE AB SERPL-ACNC: <9 IU/ML (ref 0–9)
TRIGL SERPL-MCNC: 130 MG/DL (ref 0–149)
TSH SERPL DL<=0.005 MIU/L-ACNC: 0.23 UIU/ML (ref 0.38–5.33)
WBC # BLD AUTO: 4.9 K/UL (ref 4.8–10.8)

## 2021-12-13 PROCEDURE — 83001 ASSAY OF GONADOTROPIN (FSH): CPT

## 2021-12-13 PROCEDURE — 84403 ASSAY OF TOTAL TESTOSTERONE: CPT

## 2021-12-13 PROCEDURE — 86376 MICROSOMAL ANTIBODY EACH: CPT

## 2021-12-13 PROCEDURE — 82306 VITAMIN D 25 HYDROXY: CPT

## 2021-12-13 PROCEDURE — 85025 COMPLETE CBC W/AUTO DIFF WBC: CPT

## 2021-12-13 PROCEDURE — 36415 COLL VENOUS BLD VENIPUNCTURE: CPT

## 2021-12-13 PROCEDURE — 84436 ASSAY OF TOTAL THYROXINE: CPT

## 2021-12-13 PROCEDURE — 80061 LIPID PANEL: CPT

## 2021-12-13 PROCEDURE — 82670 ASSAY OF TOTAL ESTRADIOL: CPT

## 2021-12-13 PROCEDURE — 80053 COMPREHEN METABOLIC PANEL: CPT

## 2021-12-13 PROCEDURE — 84481 FREE ASSAY (FT-3): CPT

## 2021-12-13 PROCEDURE — 84443 ASSAY THYROID STIM HORMONE: CPT

## 2021-12-17 LAB — 25(OH)D3 SERPL-MCNC: 22 NG/ML (ref 30–80)

## 2022-04-22 ENCOUNTER — TELEPHONE (OUTPATIENT)
Dept: CARDIOLOGY | Facility: PHYSICIAN GROUP | Age: 42
End: 2022-04-22
Payer: COMMERCIAL

## 2022-04-22 NOTE — TELEPHONE ENCOUNTER
Emailed pt from template providing instructions on how to access screening results from participation in 2020.

## 2022-04-22 NOTE — TELEPHONE ENCOUNTER
Can we have Leanne Wolfe given her a call to discuss signing up for the [facility] Project to assess the genetics of her hypercholesterolemia? Thanks!

## 2022-04-22 NOTE — TELEPHONE ENCOUNTER
----- Message from Dewayne Ray M.D. sent at 1/6/2021  5:18 PM PST -----  Look up [facility].

## 2022-07-18 ENCOUNTER — HOSPITAL ENCOUNTER (OUTPATIENT)
Dept: LAB | Facility: MEDICAL CENTER | Age: 42
End: 2022-07-18
Attending: ANESTHESIOLOGY
Payer: COMMERCIAL

## 2022-07-18 LAB
ANION GAP SERPL CALC-SCNC: 13 MMOL/L (ref 7–16)
BUN SERPL-MCNC: 14 MG/DL (ref 8–22)
CALCIUM SERPL-MCNC: 9.6 MG/DL (ref 8.5–10.5)
CHLORIDE SERPL-SCNC: 105 MMOL/L (ref 96–112)
CO2 SERPL-SCNC: 23 MMOL/L (ref 20–33)
CREAT SERPL-MCNC: 0.83 MG/DL (ref 0.5–1.4)
GFR SERPLBLD CREATININE-BSD FMLA CKD-EPI: 90 ML/MIN/1.73 M 2
GLUCOSE SERPL-MCNC: 83 MG/DL (ref 65–99)
POTASSIUM SERPL-SCNC: 4.3 MMOL/L (ref 3.6–5.5)
SODIUM SERPL-SCNC: 141 MMOL/L (ref 135–145)

## 2022-07-18 PROCEDURE — 36415 COLL VENOUS BLD VENIPUNCTURE: CPT

## 2022-07-18 PROCEDURE — 80048 BASIC METABOLIC PNL TOTAL CA: CPT

## 2022-09-28 ENCOUNTER — HOSPITAL ENCOUNTER (OUTPATIENT)
Dept: LAB | Facility: MEDICAL CENTER | Age: 42
End: 2022-09-28
Attending: NURSE PRACTITIONER
Payer: COMMERCIAL

## 2022-09-28 LAB
25(OH)D3 SERPL-MCNC: 32 NG/ML (ref 30–100)
BASOPHILS # BLD AUTO: 1.3 % (ref 0–1.8)
BASOPHILS # BLD: 0.05 K/UL (ref 0–0.12)
DHEA-S SERPL-MCNC: 31.5 UG/DL (ref 60.9–337)
EOSINOPHIL # BLD AUTO: 0.16 K/UL (ref 0–0.51)
EOSINOPHIL NFR BLD: 4.1 % (ref 0–6.9)
ERYTHROCYTE [DISTWIDTH] IN BLOOD BY AUTOMATED COUNT: 41.7 FL (ref 35.9–50)
ESTRADIOL SERPL-MCNC: <5 PG/ML
FSH SERPL-ACNC: 107 MIU/ML
HCT VFR BLD AUTO: 42.8 % (ref 37–47)
HGB BLD-MCNC: 14.3 G/DL (ref 12–16)
IMM GRANULOCYTES # BLD AUTO: 0.01 K/UL (ref 0–0.11)
IMM GRANULOCYTES NFR BLD AUTO: 0.3 % (ref 0–0.9)
LYMPHOCYTES # BLD AUTO: 1.38 K/UL (ref 1–4.8)
LYMPHOCYTES NFR BLD: 35.1 % (ref 22–41)
MCH RBC QN AUTO: 32 PG (ref 27–33)
MCHC RBC AUTO-ENTMCNC: 33.4 G/DL (ref 33.6–35)
MCV RBC AUTO: 95.7 FL (ref 81.4–97.8)
MONOCYTES # BLD AUTO: 0.28 K/UL (ref 0–0.85)
MONOCYTES NFR BLD AUTO: 7.1 % (ref 0–13.4)
NEUTROPHILS # BLD AUTO: 2.05 K/UL (ref 2–7.15)
NEUTROPHILS NFR BLD: 52.1 % (ref 44–72)
NRBC # BLD AUTO: 0 K/UL
NRBC BLD-RTO: 0 /100 WBC
PLATELET # BLD AUTO: 253 K/UL (ref 164–446)
PMV BLD AUTO: 10.4 FL (ref 9–12.9)
PROGEST SERPL-MCNC: 0.16 NG/ML
RBC # BLD AUTO: 4.47 M/UL (ref 4.2–5.4)
T3FREE SERPL-MCNC: 2.08 PG/ML (ref 2–4.4)
TSH SERPL DL<=0.005 MIU/L-ACNC: 2.69 UIU/ML (ref 0.38–5.33)
WBC # BLD AUTO: 3.9 K/UL (ref 4.8–10.8)

## 2022-09-28 PROCEDURE — 85025 COMPLETE CBC W/AUTO DIFF WBC: CPT

## 2022-09-28 PROCEDURE — 84270 ASSAY OF SEX HORMONE GLOBUL: CPT

## 2022-09-28 PROCEDURE — 82670 ASSAY OF TOTAL ESTRADIOL: CPT

## 2022-09-28 PROCEDURE — 82306 VITAMIN D 25 HYDROXY: CPT

## 2022-09-28 PROCEDURE — 84443 ASSAY THYROID STIM HORMONE: CPT

## 2022-09-28 PROCEDURE — 84144 ASSAY OF PROGESTERONE: CPT

## 2022-09-28 PROCEDURE — 84402 ASSAY OF FREE TESTOSTERONE: CPT

## 2022-09-28 PROCEDURE — 82627 DEHYDROEPIANDROSTERONE: CPT

## 2022-09-28 PROCEDURE — 36415 COLL VENOUS BLD VENIPUNCTURE: CPT

## 2022-09-28 PROCEDURE — 84439 ASSAY OF FREE THYROXINE: CPT

## 2022-09-28 PROCEDURE — 83001 ASSAY OF GONADOTROPIN (FSH): CPT

## 2022-09-28 PROCEDURE — 84481 FREE ASSAY (FT-3): CPT

## 2022-09-28 PROCEDURE — 84403 ASSAY OF TOTAL TESTOSTERONE: CPT

## 2022-10-03 LAB — T4 FREE SERPL DIALY-MCNC: 1.2 NG/DL (ref 1.1–2.4)

## 2022-10-07 ENCOUNTER — HOSPITAL ENCOUNTER (OUTPATIENT)
Dept: LAB | Facility: MEDICAL CENTER | Age: 42
End: 2022-10-07
Attending: PSYCHIATRY & NEUROLOGY
Payer: COMMERCIAL

## 2022-10-07 LAB
ERYTHROCYTE [SEDIMENTATION RATE] IN BLOOD BY WESTERGREN METHOD: 7 MM/HOUR (ref 0–25)
HIV 1+2 AB+HIV1 P24 AG SERPL QL IA: NORMAL
T PALLIDUM AB SER QL IA: NORMAL
VIT B12 SERPL-MCNC: 1042 PG/ML (ref 211–911)

## 2022-10-07 PROCEDURE — 86341 ISLET CELL ANTIBODY: CPT

## 2022-10-07 PROCEDURE — 36415 COLL VENOUS BLD VENIPUNCTURE: CPT

## 2022-10-07 PROCEDURE — 86694 HERPES SIMPLEX NES ANTBDY: CPT

## 2022-10-07 PROCEDURE — 87798 DETECT AGENT NOS DNA AMP: CPT

## 2022-10-07 PROCEDURE — 83519 RIA NONANTIBODY: CPT

## 2022-10-07 PROCEDURE — 86780 TREPONEMA PALLIDUM: CPT

## 2022-10-07 PROCEDURE — 82607 VITAMIN B-12: CPT

## 2022-10-07 PROCEDURE — 83825 ASSAY OF MERCURY: CPT

## 2022-10-07 PROCEDURE — 86052 AQUAPORIN-4 ANTB CBA EACH: CPT

## 2022-10-07 PROCEDURE — 87389 HIV-1 AG W/HIV-1&-2 AB AG IA: CPT

## 2022-10-07 PROCEDURE — 86618 LYME DISEASE ANTIBODY: CPT

## 2022-10-07 PROCEDURE — 86362 MOG-IGG1 ANTB CBA EACH: CPT

## 2022-10-07 PROCEDURE — 82175 ASSAY OF ARSENIC: CPT

## 2022-10-07 PROCEDURE — 82300 ASSAY OF CADMIUM: CPT

## 2022-10-07 PROCEDURE — 86255 FLUORESCENT ANTIBODY SCREEN: CPT

## 2022-10-07 PROCEDURE — 85652 RBC SED RATE AUTOMATED: CPT

## 2022-10-07 PROCEDURE — 83655 ASSAY OF LEAD: CPT

## 2022-10-07 PROCEDURE — 86038 ANTINUCLEAR ANTIBODIES: CPT

## 2022-10-08 LAB
SHBG SERPL-SCNC: 86 NMOL/L (ref 25–122)
TESTOST FREE SERPL-MCNC: 1.2 PG/ML (ref 1.1–5.8)
TESTOST SERPL-MCNC: 14 NG/DL (ref 9–55)

## 2022-10-09 LAB
ARSENIC BLD-MCNC: <10 UG/L
CADMIUM BLD-MCNC: 1.4 UG/L
LEAD BLDV-MCNC: <2 UG/DL
MERCURY BLD-MCNC: <2.5 UG/L
NUCLEAR IGG SER QL IA: NORMAL

## 2022-10-10 LAB
B BURGDOR AB SER IA-ACNC: 0.09 LIV (ref 0–1.2)
HSV1+2 IGM SER IA-ACNC: 0.27 IV

## 2022-10-12 LAB
AMPAR 1+2 IGG SERPL QL IF: NORMAL
AQP4 H2O CHANNEL IGG SERPL QL IF: NORMAL
CASPR2 IGG SERPL QL IF: NORMAL
DPPX IGG SER QL CBA IFA: NORMAL
GABABR IGG SERPL QL CBA IFA: NORMAL
GAD65 AB SER IA-ACNC: <5 IU/ML (ref 0–5)
LGI1 IGG SERPL QL IF: NORMAL
MISCELLANEOUS LAB RESULT MISCLAB: NORMAL
MOG AB SER QL CBA IFA: NORMAL
NMDAR IGG TITR SER IF: NORMAL {TITER}
VGKC AB SER-SCNC: 0 PMOL/L (ref 0–31)

## 2023-08-03 ENCOUNTER — HOSPITAL ENCOUNTER (OUTPATIENT)
Dept: LAB | Facility: MEDICAL CENTER | Age: 43
End: 2023-08-03
Attending: FAMILY MEDICINE
Payer: COMMERCIAL

## 2023-08-03 LAB
ALBUMIN SERPL BCP-MCNC: 4.5 G/DL (ref 3.2–4.9)
ALBUMIN/GLOB SERPL: 1.6 G/DL
ALP SERPL-CCNC: 69 U/L (ref 30–99)
ALT SERPL-CCNC: 31 U/L (ref 2–50)
ANION GAP SERPL CALC-SCNC: 9 MMOL/L (ref 7–16)
APPEARANCE UR: CLEAR
AST SERPL-CCNC: 30 U/L (ref 12–45)
BASOPHILS # BLD AUTO: 0.9 % (ref 0–1.8)
BASOPHILS # BLD: 0.05 K/UL (ref 0–0.12)
BILIRUB SERPL-MCNC: 0.3 MG/DL (ref 0.1–1.5)
BILIRUB UR QL STRIP.AUTO: NEGATIVE
BUN SERPL-MCNC: 17 MG/DL (ref 8–22)
CALCIUM ALBUM COR SERPL-MCNC: 9.3 MG/DL (ref 8.5–10.5)
CALCIUM SERPL-MCNC: 9.7 MG/DL (ref 8.5–10.5)
CHLORIDE SERPL-SCNC: 107 MMOL/L (ref 96–112)
CHOLEST SERPL-MCNC: 446 MG/DL (ref 100–199)
CO2 SERPL-SCNC: 26 MMOL/L (ref 20–33)
COLOR UR: YELLOW
CREAT SERPL-MCNC: 0.83 MG/DL (ref 0.5–1.4)
EOSINOPHIL # BLD AUTO: 0.22 K/UL (ref 0–0.51)
EOSINOPHIL NFR BLD: 4 % (ref 0–6.9)
ERYTHROCYTE [DISTWIDTH] IN BLOOD BY AUTOMATED COUNT: 43 FL (ref 35.9–50)
FASTING STATUS PATIENT QL REPORTED: NORMAL
GFR SERPLBLD CREATININE-BSD FMLA CKD-EPI: 89 ML/MIN/1.73 M 2
GLOBULIN SER CALC-MCNC: 2.8 G/DL (ref 1.9–3.5)
GLUCOSE SERPL-MCNC: 90 MG/DL (ref 65–99)
GLUCOSE UR STRIP.AUTO-MCNC: NEGATIVE MG/DL
HCT VFR BLD AUTO: 46.9 % (ref 37–47)
HDLC SERPL-MCNC: 53 MG/DL
HGB BLD-MCNC: 15.5 G/DL (ref 12–16)
IMM GRANULOCYTES # BLD AUTO: 0.02 K/UL (ref 0–0.11)
IMM GRANULOCYTES NFR BLD AUTO: 0.4 % (ref 0–0.9)
KETONES UR STRIP.AUTO-MCNC: NEGATIVE MG/DL
LDLC SERPL CALC-MCNC: 357 MG/DL
LEUKOCYTE ESTERASE UR QL STRIP.AUTO: NEGATIVE
LYMPHOCYTES # BLD AUTO: 2.14 K/UL (ref 1–4.8)
LYMPHOCYTES NFR BLD: 38.5 % (ref 22–41)
MCH RBC QN AUTO: 31.6 PG (ref 27–33)
MCHC RBC AUTO-ENTMCNC: 33 G/DL (ref 32.2–35.5)
MCV RBC AUTO: 95.7 FL (ref 81.4–97.8)
MICRO URNS: NORMAL
MONOCYTES # BLD AUTO: 0.49 K/UL (ref 0–0.85)
MONOCYTES NFR BLD AUTO: 8.8 % (ref 0–13.4)
NEUTROPHILS # BLD AUTO: 2.64 K/UL (ref 1.82–7.42)
NEUTROPHILS NFR BLD: 47.4 % (ref 44–72)
NITRITE UR QL STRIP.AUTO: NEGATIVE
NRBC # BLD AUTO: 0 K/UL
NRBC BLD-RTO: 0 /100 WBC (ref 0–0.2)
PH UR STRIP.AUTO: 6 [PH] (ref 5–8)
PLATELET # BLD AUTO: 296 K/UL (ref 164–446)
PMV BLD AUTO: 9.7 FL (ref 9–12.9)
POTASSIUM SERPL-SCNC: 4.8 MMOL/L (ref 3.6–5.5)
PROT SERPL-MCNC: 7.3 G/DL (ref 6–8.2)
PROT UR QL STRIP: NEGATIVE MG/DL
RBC # BLD AUTO: 4.9 M/UL (ref 4.2–5.4)
RBC UR QL AUTO: NEGATIVE
SODIUM SERPL-SCNC: 142 MMOL/L (ref 135–145)
SP GR UR STRIP.AUTO: 1.03
T4 SERPL-MCNC: 7.8 UG/DL (ref 4–12)
TRIGL SERPL-MCNC: 178 MG/DL (ref 0–149)
TSH SERPL DL<=0.005 MIU/L-ACNC: 2.64 UIU/ML (ref 0.38–5.33)
UROBILINOGEN UR STRIP.AUTO-MCNC: 0.2 MG/DL
WBC # BLD AUTO: 5.6 K/UL (ref 4.8–10.8)

## 2023-08-03 PROCEDURE — 81003 URINALYSIS AUTO W/O SCOPE: CPT

## 2023-08-03 PROCEDURE — 85025 COMPLETE CBC W/AUTO DIFF WBC: CPT

## 2023-08-03 PROCEDURE — 84443 ASSAY THYROID STIM HORMONE: CPT

## 2023-08-03 PROCEDURE — 84436 ASSAY OF TOTAL THYROXINE: CPT

## 2023-08-03 PROCEDURE — 36415 COLL VENOUS BLD VENIPUNCTURE: CPT

## 2023-08-03 PROCEDURE — 80053 COMPREHEN METABOLIC PANEL: CPT

## 2023-08-03 PROCEDURE — 80061 LIPID PANEL: CPT

## 2024-08-23 ENCOUNTER — HOSPITAL ENCOUNTER (OUTPATIENT)
Dept: LAB | Facility: MEDICAL CENTER | Age: 44
End: 2024-08-23
Attending: FAMILY MEDICINE
Payer: COMMERCIAL

## 2024-08-23 LAB
25(OH)D3 SERPL-MCNC: 36 NG/ML (ref 30–100)
ALBUMIN SERPL BCP-MCNC: 4.5 G/DL (ref 3.2–4.9)
ALBUMIN/GLOB SERPL: 1.7 G/DL
ALP SERPL-CCNC: 77 U/L (ref 30–99)
ALT SERPL-CCNC: 23 U/L (ref 2–50)
AMORPH CRY #/AREA URNS HPF: PRESENT /HPF
ANION GAP SERPL CALC-SCNC: 11 MMOL/L (ref 7–16)
APPEARANCE UR: ABNORMAL
AST SERPL-CCNC: 22 U/L (ref 12–45)
BACTERIA #/AREA URNS HPF: ABNORMAL /HPF
BASOPHILS # BLD AUTO: 0.8 % (ref 0–1.8)
BASOPHILS # BLD: 0.05 K/UL (ref 0–0.12)
BILIRUB SERPL-MCNC: 0.3 MG/DL (ref 0.1–1.5)
BILIRUB UR QL STRIP.AUTO: NEGATIVE
BUN SERPL-MCNC: 19 MG/DL (ref 8–22)
CALCIUM ALBUM COR SERPL-MCNC: 9.4 MG/DL (ref 8.5–10.5)
CALCIUM SERPL-MCNC: 9.8 MG/DL (ref 8.5–10.5)
CHLORIDE SERPL-SCNC: 103 MMOL/L (ref 96–112)
CO2 SERPL-SCNC: 23 MMOL/L (ref 20–33)
COLOR UR: YELLOW
CREAT SERPL-MCNC: 0.71 MG/DL (ref 0.5–1.4)
EOSINOPHIL # BLD AUTO: 0.17 K/UL (ref 0–0.51)
EOSINOPHIL NFR BLD: 2.8 % (ref 0–6.9)
EPI CELLS #/AREA URNS HPF: ABNORMAL /HPF
ERYTHROCYTE [DISTWIDTH] IN BLOOD BY AUTOMATED COUNT: 43.1 FL (ref 35.9–50)
ERYTHROCYTE [SEDIMENTATION RATE] IN BLOOD BY WESTERGREN METHOD: 7 MM/HOUR (ref 0–25)
EST. AVERAGE GLUCOSE BLD GHB EST-MCNC: 105 MG/DL
FSH SERPL-ACNC: 91.1 MIU/ML
GFR SERPLBLD CREATININE-BSD FMLA CKD-EPI: 107 ML/MIN/1.73 M 2
GLOBULIN SER CALC-MCNC: 2.7 G/DL (ref 1.9–3.5)
GLUCOSE SERPL-MCNC: 94 MG/DL (ref 65–99)
GLUCOSE UR STRIP.AUTO-MCNC: NEGATIVE MG/DL
HBA1C MFR BLD: 5.3 % (ref 4–5.6)
HCT VFR BLD AUTO: 46.9 % (ref 37–47)
HGB BLD-MCNC: 15.2 G/DL (ref 12–16)
HYALINE CASTS #/AREA URNS LPF: ABNORMAL /LPF
IMM GRANULOCYTES # BLD AUTO: 0.01 K/UL (ref 0–0.11)
IMM GRANULOCYTES NFR BLD AUTO: 0.2 % (ref 0–0.9)
KETONES UR STRIP.AUTO-MCNC: NEGATIVE MG/DL
LEUKOCYTE ESTERASE UR QL STRIP.AUTO: NEGATIVE
LH SERPL-ACNC: 46.4 IU/L
LYMPHOCYTES # BLD AUTO: 2.51 K/UL (ref 1–4.8)
LYMPHOCYTES NFR BLD: 40.7 % (ref 22–41)
MCH RBC QN AUTO: 31.2 PG (ref 27–33)
MCHC RBC AUTO-ENTMCNC: 32.4 G/DL (ref 32.2–35.5)
MCV RBC AUTO: 96.3 FL (ref 81.4–97.8)
MICRO URNS: ABNORMAL
MONOCYTES # BLD AUTO: 0.57 K/UL (ref 0–0.85)
MONOCYTES NFR BLD AUTO: 9.3 % (ref 0–13.4)
NEUTROPHILS # BLD AUTO: 2.85 K/UL (ref 1.82–7.42)
NEUTROPHILS NFR BLD: 46.2 % (ref 44–72)
NITRITE UR QL STRIP.AUTO: NEGATIVE
NRBC # BLD AUTO: 0 K/UL
NRBC BLD-RTO: 0 /100 WBC (ref 0–0.2)
PH UR STRIP.AUTO: 6 [PH] (ref 5–8)
PLATELET # BLD AUTO: 325 K/UL (ref 164–446)
PMV BLD AUTO: 10.1 FL (ref 9–12.9)
POTASSIUM SERPL-SCNC: 5 MMOL/L (ref 3.6–5.5)
PROGEST SERPL-MCNC: 0.32 NG/ML
PROT SERPL-MCNC: 7.2 G/DL (ref 6–8.2)
PROT UR QL STRIP: NEGATIVE MG/DL
RBC # BLD AUTO: 4.87 M/UL (ref 4.2–5.4)
RBC # URNS HPF: ABNORMAL /HPF
RBC UR QL AUTO: ABNORMAL
RHEUMATOID FACT SER IA-ACNC: 12 IU/ML (ref 0–14)
SODIUM SERPL-SCNC: 137 MMOL/L (ref 135–145)
SP GR UR STRIP.AUTO: >=1.03
T4 SERPL-MCNC: 9.4 UG/DL (ref 4–12)
TSH SERPL-ACNC: 1.49 UIU/ML (ref 0.35–5.5)
URATE SERPL-MCNC: 5 MG/DL (ref 1.9–8.2)
UROBILINOGEN UR STRIP.AUTO-MCNC: 0.2 MG/DL
VIT B12 SERPL-MCNC: 423 PG/ML (ref 211–911)
WBC # BLD AUTO: 6.2 K/UL (ref 4.8–10.8)
WBC #/AREA URNS HPF: ABNORMAL /HPF

## 2024-08-23 PROCEDURE — 82671 ASSAY OF ESTROGENS: CPT

## 2024-08-23 PROCEDURE — 80053 COMPREHEN METABOLIC PANEL: CPT

## 2024-08-23 PROCEDURE — 83002 ASSAY OF GONADOTROPIN (LH): CPT

## 2024-08-23 PROCEDURE — 82306 VITAMIN D 25 HYDROXY: CPT

## 2024-08-23 PROCEDURE — 84550 ASSAY OF BLOOD/URIC ACID: CPT

## 2024-08-23 PROCEDURE — 85652 RBC SED RATE AUTOMATED: CPT

## 2024-08-23 PROCEDURE — 84144 ASSAY OF PROGESTERONE: CPT

## 2024-08-23 PROCEDURE — 82607 VITAMIN B-12: CPT

## 2024-08-23 PROCEDURE — 81001 URINALYSIS AUTO W/SCOPE: CPT

## 2024-08-23 PROCEDURE — 84443 ASSAY THYROID STIM HORMONE: CPT

## 2024-08-23 PROCEDURE — 83001 ASSAY OF GONADOTROPIN (FSH): CPT

## 2024-08-23 PROCEDURE — 85025 COMPLETE CBC W/AUTO DIFF WBC: CPT

## 2024-08-23 PROCEDURE — 36415 COLL VENOUS BLD VENIPUNCTURE: CPT

## 2024-08-23 PROCEDURE — 84436 ASSAY OF TOTAL THYROXINE: CPT

## 2024-08-23 PROCEDURE — 86431 RHEUMATOID FACTOR QUANT: CPT

## 2024-08-23 PROCEDURE — 83036 HEMOGLOBIN GLYCOSYLATED A1C: CPT

## 2024-08-27 ENCOUNTER — HOSPITAL ENCOUNTER (OUTPATIENT)
Dept: RADIOLOGY | Facility: MEDICAL CENTER | Age: 44
End: 2024-08-27
Attending: FAMILY MEDICINE
Payer: COMMERCIAL

## 2024-08-27 DIAGNOSIS — Z12.31 VISIT FOR SCREENING MAMMOGRAM: ICD-10-CM

## 2024-08-27 LAB
ESTRADIOL SERPL HS-MCNC: 23.6 PG/ML
ESTROGEN SERPL CALC-MCNC: 163.4 PG/ML
ESTRONE SERPL-MCNC: 139.8 PG/ML

## 2024-08-31 ENCOUNTER — OFFICE VISIT (OUTPATIENT)
Dept: URGENT CARE | Facility: CLINIC | Age: 44
End: 2024-08-31
Payer: COMMERCIAL

## 2024-08-31 VITALS
TEMPERATURE: 97.8 F | BODY MASS INDEX: 25.07 KG/M2 | OXYGEN SATURATION: 98 % | WEIGHT: 159.7 LBS | DIASTOLIC BLOOD PRESSURE: 86 MMHG | HEART RATE: 95 BPM | HEIGHT: 67 IN | SYSTOLIC BLOOD PRESSURE: 132 MMHG | RESPIRATION RATE: 19 BRPM
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
## 2024-08-31 DIAGNOSIS — S31.825A DOG BITE OF LEFT BUTTOCK, INITIAL ENCOUNTER: ICD-10-CM

## 2024-08-31 PROCEDURE — 3075F SYST BP GE 130 - 139MM HG: CPT | Performed by: PHYSICIAN ASSISTANT

## 2024-08-31 PROCEDURE — 90471 IMMUNIZATION ADMIN: CPT | Performed by: PHYSICIAN ASSISTANT

## 2024-08-31 PROCEDURE — 3079F DIAST BP 80-89 MM HG: CPT | Performed by: PHYSICIAN ASSISTANT

## 2024-08-31 PROCEDURE — 99203 OFFICE O/P NEW LOW 30 MIN: CPT | Mod: 25 | Performed by: PHYSICIAN ASSISTANT

## 2024-08-31 PROCEDURE — 90715 TDAP VACCINE 7 YRS/> IM: CPT | Performed by: PHYSICIAN ASSISTANT

## 2024-08-31 RX ORDER — DEXTROAMPHETAMINE SACCHARATE, AMPHETAMINE ASPARTATE, DEXTROAMPHETAMINE SULFATE AND AMPHETAMINE SULFATE 2.5; 2.5; 2.5; 2.5 MG/1; MG/1; MG/1; MG/1
10 TABLET ORAL DAILY
COMMUNITY

## 2024-08-31 RX ORDER — ESTRADIOL 1 MG/1
1 TABLET ORAL DAILY
COMMUNITY

## 2024-08-31 RX ORDER — LEVOTHYROXINE SODIUM 100 UG/1
TABLET ORAL
COMMUNITY
Start: 2024-07-21

## 2024-08-31 RX ORDER — ALPRAZOLAM 0.25 MG
TABLET ORAL
COMMUNITY
Start: 2024-08-26

## 2024-08-31 RX ORDER — LEVOTHYROXINE SODIUM 88 UG/1
75 TABLET ORAL DAILY
COMMUNITY

## 2024-08-31 ASSESSMENT — FIBROSIS 4 INDEX: FIB4 SCORE: 0.62

## 2024-09-01 ASSESSMENT — ENCOUNTER SYMPTOMS
NAUSEA: 0
CHILLS: 0
FEVER: 0
VOMITING: 0

## 2024-09-01 NOTE — PROGRESS NOTES
Subjective:   Candy Dey is a 44 y.o. female who presents for Dog Bite (Pt has a dog bite under (L) buttocks )        Patient presents with concerns of dog bite wound to the left buttock.  Dog belonged to a friend of a friend and is up-to-date on vaccinations.  Patient states wound bled quite a bit.  No reported numbness or tingling.  Date of last tetanus was when patient was 7 years old.      ROS    PMH:  has a past medical history of Familial hyperlipidemia and Thyroid disease.  MEDS:   Current Outpatient Medications:     levothyroxine (SYNTHROID) 100 MCG Tab, , Disp: , Rfl:     ALPRAZolam (XANAX) 0.25 MG Tab, , Disp: , Rfl:     estradiol (ESTRACE) 1 MG Tab, Take 1 mg by mouth every day., Disp: , Rfl:     amphetamine-dextroamphetamine (ADDERALL) 10 MG Tab, Take 10 mg by mouth every day., Disp: , Rfl:     levothyroxine (SYNTHROID) 88 MCG Tab, Take 75 mcg by mouth every day., Disp: , Rfl:     thyroid (ARMOUR THYROID) 30 MG Tab, Take 30 mg by mouth every day. 3 GRAINS., Disp: , Rfl:     REPATHA SURECLICK 140 MG/ML Solution Auto-injector, , Disp: , Rfl:     aspirin EC (ECOTRIN) 81 MG Tablet Delayed Response, Take 1 Tab by mouth every day. (Patient not taking: Reported on 8/31/2024), Disp: 90 Tab, Rfl: 3    atorvastatin (LIPITOR) 80 MG tablet, Take 1 Tab by mouth every bedtime. (Patient not taking: Reported on 8/31/2024), Disp: 90 Tab, Rfl: 3    carisoprodol (SOMA) 350 MG Tab, , Disp: , Rfl:     zolpidem (AMBIEN) 10 MG Tab, , Disp: , Rfl:     Evolocumab (REPATHA SC), Inject 1 Dose as instructed every 14 days. (Patient not taking: Reported on 8/31/2024), Disp: , Rfl:   ALLERGIES:   Allergies   Allergen Reactions    Bactrim Ds      Fully body pain and myopathy, severe    Sulfa Drugs      SURGHX:   Past Surgical History:   Procedure Laterality Date    ABDOMINAL HYSTERECTOMY TOTAL  2004     SOCHX:  reports that she has been smoking cigarettes. She started smoking about 24 years ago. She has a 5 pack-year smoking  "history. She has never used smokeless tobacco. She reports current alcohol use. She reports current drug use. Drug: Oral.  FH: Family history was reviewed, no pertinent findings to report   Objective:   /86 (BP Location: Left arm, Patient Position: Sitting, BP Cuff Size: Small adult)   Pulse 95   Temp 36.6 °C (97.8 °F) (Temporal)   Resp 19   Ht 1.689 m (5' 6.5\")   Wt 72.4 kg (159 lb 11.2 oz)   SpO2 98%   BMI 25.39 kg/m²   Physical Exam      Assessment/Plan:   There are no diagnoses linked to this encounter.  @@  " every day.  - amphetamine-dextroamphetamine (ADDERALL) 10 MG Tab; Take 10 mg by mouth every day.    Tdap updated today.  Wounds thoroughly irrigated and cleaned.  Patient started on prophylactic antibiotics.  Monitor closely for signs and symptoms of infection-strict return precautions.

## 2024-09-04 ENCOUNTER — HOSPITAL ENCOUNTER (OUTPATIENT)
Dept: RADIOLOGY | Facility: MEDICAL CENTER | Age: 44
End: 2024-09-04
Attending: FAMILY MEDICINE
Payer: COMMERCIAL

## 2024-09-04 DIAGNOSIS — N63.10 MASS OF MULTIPLE SITES OF RIGHT BREAST: ICD-10-CM

## 2024-09-04 PROCEDURE — G0279 TOMOSYNTHESIS, MAMMO: HCPCS

## 2024-09-04 PROCEDURE — 76642 ULTRASOUND BREAST LIMITED: CPT | Mod: RT
